# Patient Record
Sex: FEMALE | Race: WHITE | NOT HISPANIC OR LATINO | Employment: UNEMPLOYED | URBAN - METROPOLITAN AREA
[De-identification: names, ages, dates, MRNs, and addresses within clinical notes are randomized per-mention and may not be internally consistent; named-entity substitution may affect disease eponyms.]

---

## 2017-02-09 ENCOUNTER — HOSPITAL ENCOUNTER (EMERGENCY)
Facility: HOSPITAL | Age: 22
Discharge: HOME | End: 2017-02-09
Admitting: EMERGENCY MEDICINE

## 2017-02-09 VITALS
RESPIRATION RATE: 18 BRPM | DIASTOLIC BLOOD PRESSURE: 84 MMHG | SYSTOLIC BLOOD PRESSURE: 124 MMHG | HEIGHT: 67 IN | HEART RATE: 70 BPM | BODY MASS INDEX: 37.51 KG/M2 | WEIGHT: 239 LBS | TEMPERATURE: 98.1 F | OXYGEN SATURATION: 100 %

## 2017-02-09 DIAGNOSIS — K08.89 PAIN, DENTAL: Primary | ICD-10-CM

## 2017-02-09 DIAGNOSIS — K08.409 H/O TOOTH EXTRACTION, UNSPECIFIED EDENTULISM: ICD-10-CM

## 2017-02-09 PROCEDURE — 99282 EMERGENCY DEPT VISIT SF MDM: CPT

## 2017-02-09 PROCEDURE — 99282 EMERGENCY DEPT VISIT SF MDM: CPT | Performed by: NURSE PRACTITIONER

## 2017-02-09 RX ORDER — HYDROCODONE BITARTRATE AND ACETAMINOPHEN 5; 325 MG/1; MG/1
1 TABLET ORAL EVERY 4 HOURS PRN
Qty: 12 TABLET | Refills: 0 | Status: SHIPPED | OUTPATIENT
Start: 2017-02-09 | End: 2018-05-18

## 2017-02-10 NOTE — ED PROVIDER NOTES
Subjective   Patient is a 22 y.o. female presenting with tooth pain.   Dental Pain   Location:  Lower  Lower teeth location:  18/LL 2nd molar  Quality:  Throbbing  Severity:  Moderate  Onset quality:  Sudden  Duration:  1 day  Timing:  Constant  Progression:  Worsening  Chronicity:  New  Context: recent dental surgery    Context: not abscess, cap still on, not crown fracture, not dental caries, not dental fracture, not enamel fracture, filling intact, not intrusion, not malocclusion, normal dentition and not trauma    Previous work-up:  Dental exam  Relieved by:  Nothing  Worsened by:  Jaw movement and touching  Associated symptoms: no congestion and no facial swelling        Review of Systems   Constitutional: Positive for appetite change. Negative for activity change, chills, diaphoresis and fatigue.   HENT: Positive for ear pain. Negative for congestion, facial swelling, postnasal drip, rhinorrhea, sinus pressure, sore throat and trouble swallowing.         Second molar missing left lower.  Facial pain   Eyes: Negative for visual disturbance.   Respiratory: Negative for chest tightness, shortness of breath, wheezing and stridor.    Gastrointestinal: Negative for abdominal pain, diarrhea, nausea and vomiting.   Neurological: Positive for dizziness. Negative for facial asymmetry and weakness.       Past Medical History   Diagnosis Date   • Asthma        No Known Allergies    Past Surgical History   Procedure Laterality Date   • Tonsillectomy and adenoidectomy     • [place] tooth extraction Bilateral        Family History   Problem Relation Age of Onset   • Diabetes Mother    • Heart attack Father    • Cancer Sister    • Diabetes Maternal Grandmother    • Cancer Maternal Grandmother    • Diabetes Maternal Grandfather    • Stroke Maternal Grandfather    • Heart attack Maternal Grandfather        Social History     Social History   • Marital status: Single     Spouse name: N/A   • Number of children: N/A   • Years of  education: N/A     Social History Main Topics   • Smoking status: Former Smoker     Packs/day: 1.00     Years: 6.00     Types: Cigarettes     Quit date: 4/2/2016   • Smokeless tobacco: None   • Alcohol use No   • Drug use: No   • Sexual activity: Yes     Partners: Male     Other Topics Concern   • None     Social History Narrative           Objective   Physical Exam   Constitutional: She is oriented to person, place, and time. She appears well-developed and well-nourished. No distress.   HENT:   Head: Normocephalic and atraumatic.   Right Ear: External ear normal.   Left Ear: External ear normal.   Nose: Nose normal.   Oropharynx left second molar on lower missing.  Signs of recent extraction noted.   Eyes: Conjunctivae are normal. Pupils are equal, round, and reactive to light.   Neck: Neck supple. No tracheal deviation present. No thyromegaly present.   Cardiovascular: Normal rate, regular rhythm and normal heart sounds.  Exam reveals no gallop.    No murmur heard.  Pulmonary/Chest: Effort normal and breath sounds normal. No respiratory distress. She has no wheezes. She has no rales.   Abdominal: Soft. Bowel sounds are normal. She exhibits no distension and no mass. There is no tenderness. There is no rebound.   Musculoskeletal: Normal range of motion. She exhibits no edema or tenderness.   Lymphadenopathy:     She has no cervical adenopathy.   Neurological: She is alert and oriented to person, place, and time.   Skin: Skin is warm and dry. No rash noted. She is not diaphoretic. No erythema.   Psychiatric: She has a normal mood and affect. Judgment and thought content normal.   Nursing note and vitals reviewed.      Procedures         ED Course  ED Course   Comment By Time   She presents the ED complaining of dental pain.  A molar extracted yesterday and was given ibuprofen.  Says he ibuprofen is not helping Blade Alvarez, APRN 02/09 1927                  [facility]    Final diagnoses:   Pain, dental   H/O tooth  extraction, unspecified edentulism            Blade Alvarez, APRN  02/09/17 2019

## 2017-09-01 ENCOUNTER — APPOINTMENT (OUTPATIENT)
Dept: CT IMAGING | Facility: HOSPITAL | Age: 22
End: 2017-09-01

## 2017-09-01 ENCOUNTER — HOSPITAL ENCOUNTER (EMERGENCY)
Facility: HOSPITAL | Age: 22
Discharge: HOME | End: 2017-09-01
Attending: EMERGENCY MEDICINE | Admitting: EMERGENCY MEDICINE

## 2017-09-01 VITALS
HEART RATE: 118 BPM | TEMPERATURE: 99.2 F | HEIGHT: 67 IN | OXYGEN SATURATION: 97 % | DIASTOLIC BLOOD PRESSURE: 72 MMHG | WEIGHT: 253 LBS | RESPIRATION RATE: 20 BRPM | BODY MASS INDEX: 39.71 KG/M2 | SYSTOLIC BLOOD PRESSURE: 120 MMHG

## 2017-09-01 DIAGNOSIS — K52.9 COLITIS: Primary | ICD-10-CM

## 2017-09-01 LAB
ALBUMIN SERPL-MCNC: 4.4 G/DL (ref 3.5–5.2)
ALBUMIN/GLOB SERPL: 1.3 G/DL
ALP SERPL-CCNC: 103 U/L (ref 40–129)
ALT SERPL W P-5'-P-CCNC: 17 U/L (ref 5–33)
ANION GAP SERPL CALCULATED.3IONS-SCNC: 17.7 MMOL/L
AST SERPL-CCNC: 13 U/L (ref 5–32)
B-HCG UR QL: NEGATIVE
BACTERIA UR QL AUTO: ABNORMAL /HPF
BASOPHILS # BLD AUTO: 0.04 10*3/MM3 (ref 0–0.2)
BASOPHILS NFR BLD AUTO: 0.2 % (ref 0–2)
BILIRUB SERPL-MCNC: 0.5 MG/DL (ref 0.2–1.2)
BILIRUB UR QL STRIP: NEGATIVE
BUN BLD-MCNC: 9 MG/DL (ref 6–20)
BUN/CREAT SERPL: 10.3 (ref 7–25)
CALCIUM SPEC-SCNC: 9.6 MG/DL (ref 8.6–10.5)
CHLORIDE SERPL-SCNC: 102 MMOL/L (ref 98–107)
CLARITY UR: ABNORMAL
CO2 SERPL-SCNC: 20.3 MMOL/L (ref 22–29)
COLOR UR: YELLOW
CREAT BLD-MCNC: 0.87 MG/DL (ref 0.57–1)
D-LACTATE SERPL-SCNC: 0.9 MMOL/L (ref 0.5–2)
DEPRECATED RDW RBC AUTO: 41.3 FL (ref 37–54)
EOSINOPHIL # BLD AUTO: 0 10*3/MM3 (ref 0.1–0.3)
EOSINOPHIL NFR BLD AUTO: 0 % (ref 0–4)
ERYTHROCYTE [DISTWIDTH] IN BLOOD BY AUTOMATED COUNT: 13.3 % (ref 11.5–14.5)
GFR SERPL CREATININE-BSD FRML MDRD: 82 ML/MIN/1.73
GLOBULIN UR ELPH-MCNC: 3.4 GM/DL
GLUCOSE BLD-MCNC: 141 MG/DL (ref 65–99)
GLUCOSE UR STRIP-MCNC: NEGATIVE MG/DL
HCT VFR BLD AUTO: 42.7 % (ref 37–47)
HGB BLD-MCNC: 14.4 G/DL (ref 12–16)
HGB UR QL STRIP.AUTO: ABNORMAL
IMM GRANULOCYTES # BLD: 0.08 10*3/MM3 (ref 0–0.03)
IMM GRANULOCYTES NFR BLD: 0.5 % (ref 0–0.5)
KETONES UR QL STRIP: ABNORMAL
LEUKOCYTE ESTERASE UR QL STRIP.AUTO: ABNORMAL
LYMPHOCYTES # BLD AUTO: 0.81 10*3/MM3 (ref 0.6–4.8)
LYMPHOCYTES NFR BLD AUTO: 4.7 % (ref 20–45)
MCH RBC QN AUTO: 28.7 PG (ref 27–31)
MCHC RBC AUTO-ENTMCNC: 33.7 G/DL (ref 31–37)
MCV RBC AUTO: 85.2 FL (ref 81–99)
MONOCYTES # BLD AUTO: 1.11 10*3/MM3 (ref 0–1)
MONOCYTES NFR BLD AUTO: 6.5 % (ref 3–8)
NEUTROPHILS # BLD AUTO: 15.06 10*3/MM3 (ref 1.5–8.3)
NEUTROPHILS NFR BLD AUTO: 88.1 % (ref 45–70)
NITRITE UR QL STRIP: NEGATIVE
NRBC BLD MANUAL-RTO: 0 /100 WBC (ref 0–0)
PH UR STRIP.AUTO: 5.5 [PH] (ref 4.5–8)
PLATELET # BLD AUTO: 188 10*3/MM3 (ref 140–500)
PMV BLD AUTO: 13.8 FL (ref 7.4–10.4)
POTASSIUM BLD-SCNC: 3.4 MMOL/L (ref 3.5–5.2)
PROT SERPL-MCNC: 7.8 G/DL (ref 6–8.5)
PROT UR QL STRIP: ABNORMAL
RBC # BLD AUTO: 5.01 10*6/MM3 (ref 4.2–5.4)
RBC # UR: ABNORMAL /HPF
REF LAB TEST METHOD: ABNORMAL
SODIUM BLD-SCNC: 140 MMOL/L (ref 136–145)
SP GR UR STRIP: 1.02 (ref 1–1.03)
SQUAMOUS #/AREA URNS HPF: ABNORMAL /HPF
UROBILINOGEN UR QL STRIP: ABNORMAL
WBC NRBC COR # BLD: 17.1 10*3/MM3 (ref 4.8–10.8)
WBC UR QL AUTO: ABNORMAL /HPF

## 2017-09-01 PROCEDURE — 96374 THER/PROPH/DIAG INJ IV PUSH: CPT

## 2017-09-01 PROCEDURE — 25010000002 ONDANSETRON PER 1 MG: Performed by: EMERGENCY MEDICINE

## 2017-09-01 PROCEDURE — 81001 URINALYSIS AUTO W/SCOPE: CPT | Performed by: EMERGENCY MEDICINE

## 2017-09-01 PROCEDURE — 83605 ASSAY OF LACTIC ACID: CPT | Performed by: EMERGENCY MEDICINE

## 2017-09-01 PROCEDURE — 87186 SC STD MICRODIL/AGAR DIL: CPT | Performed by: EMERGENCY MEDICINE

## 2017-09-01 PROCEDURE — 80053 COMPREHEN METABOLIC PANEL: CPT | Performed by: EMERGENCY MEDICINE

## 2017-09-01 PROCEDURE — 81025 URINE PREGNANCY TEST: CPT | Performed by: EMERGENCY MEDICINE

## 2017-09-01 PROCEDURE — 87040 BLOOD CULTURE FOR BACTERIA: CPT | Performed by: EMERGENCY MEDICINE

## 2017-09-01 PROCEDURE — 99284 EMERGENCY DEPT VISIT MOD MDM: CPT | Performed by: EMERGENCY MEDICINE

## 2017-09-01 PROCEDURE — 85025 COMPLETE CBC W/AUTO DIFF WBC: CPT | Performed by: EMERGENCY MEDICINE

## 2017-09-01 PROCEDURE — 87086 URINE CULTURE/COLONY COUNT: CPT | Performed by: EMERGENCY MEDICINE

## 2017-09-01 PROCEDURE — 96361 HYDRATE IV INFUSION ADD-ON: CPT

## 2017-09-01 PROCEDURE — 96376 TX/PRO/DX INJ SAME DRUG ADON: CPT

## 2017-09-01 PROCEDURE — 99283 EMERGENCY DEPT VISIT LOW MDM: CPT

## 2017-09-01 PROCEDURE — 0 IOPAMIDOL PER 1 ML: Performed by: EMERGENCY MEDICINE

## 2017-09-01 PROCEDURE — 74177 CT ABD & PELVIS W/CONTRAST: CPT

## 2017-09-01 RX ORDER — CIPROFLOXACIN 500 MG/1
500 TABLET, FILM COATED ORAL 2 TIMES DAILY
Qty: 14 TABLET | Refills: 0 | Status: SHIPPED | OUTPATIENT
Start: 2017-09-01 | End: 2018-05-18

## 2017-09-01 RX ORDER — ONDANSETRON 2 MG/ML
4 INJECTION INTRAMUSCULAR; INTRAVENOUS ONCE
Status: COMPLETED | OUTPATIENT
Start: 2017-09-01 | End: 2017-09-01

## 2017-09-01 RX ORDER — SODIUM CHLORIDE 0.9 % (FLUSH) 0.9 %
10 SYRINGE (ML) INJECTION AS NEEDED
Status: DISCONTINUED | OUTPATIENT
Start: 2017-09-01 | End: 2017-09-01 | Stop reason: HOSPADM

## 2017-09-01 RX ADMIN — SODIUM CHLORIDE 1000 ML: 9 INJECTION, SOLUTION INTRAVENOUS at 10:00

## 2017-09-01 RX ADMIN — IOPAMIDOL 100 ML: 755 INJECTION, SOLUTION INTRAVENOUS at 13:21

## 2017-09-01 RX ADMIN — ONDANSETRON 4 MG: 2 INJECTION, SOLUTION INTRAMUSCULAR; INTRAVENOUS at 10:02

## 2017-09-01 RX ADMIN — ONDANSETRON 4 MG: 2 INJECTION, SOLUTION INTRAMUSCULAR; INTRAVENOUS at 12:12

## 2017-09-01 RX ADMIN — SODIUM CHLORIDE 1000 ML: 9 INJECTION, SOLUTION INTRAVENOUS at 12:15

## 2017-09-01 NOTE — ED PROVIDER NOTES
Subjective   Patient is a 22 y.o. female presenting with vomiting.   History provided by:  Patient  Vomiting   The primary symptoms include fever, nausea, vomiting, diarrhea, myalgias and arthralgias. Primary symptoms do not include abdominal pain, dysuria or rash. The illness began yesterday. The onset was gradual. The problem has not changed since onset.  The illness is also significant for chills and anorexia. The illness does not include back pain. Associated medical issues do not include liver disease, alcohol abuse, PUD, gastric bypass or bowel resection.       Review of Systems   Constitutional: Positive for chills and fever.   HENT: Negative.    Eyes: Negative.    Respiratory: Negative.    Cardiovascular: Negative.    Gastrointestinal: Positive for anorexia, diarrhea, nausea and vomiting. Negative for abdominal pain.   Endocrine: Negative.    Genitourinary: Negative for dysuria, vaginal bleeding and vaginal discharge.   Musculoskeletal: Positive for arthralgias and myalgias. Negative for back pain.   Skin: Negative for rash.   Neurological: Negative.    Hematological: Bruises/bleeds easily.       Past Medical History:   Diagnosis Date   • Asthma    • Rosacea    • UTI (urinary tract infection)        No Known Allergies    Past Surgical History:   Procedure Laterality Date   • TONSILLECTOMY AND ADENOIDECTOMY     • WISDOM TOOTH EXTRACTION Bilateral        Family History   Problem Relation Age of Onset   • Diabetes Mother    • Heart attack Father    • Cancer Sister    • Diabetes Maternal Grandmother    • Cancer Maternal Grandmother    • Diabetes Maternal Grandfather    • Stroke Maternal Grandfather    • Heart attack Maternal Grandfather        Social History     Social History   • Marital status: Single     Spouse name: N/A   • Number of children: N/A   • Years of education: N/A     Social History Main Topics   • Smoking status: Former Smoker     Packs/day: 1.00     Years: 6.00     Types: Cigarettes     Quit  date: 4/2/2016   • Smokeless tobacco: None   • Alcohol use Yes      Comment: rare   • Drug use: No   • Sexual activity: Yes     Partners: Male     Other Topics Concern   • None     Social History Narrative   • None           Objective   Physical Exam   Constitutional: She is oriented to person, place, and time. She appears well-developed and well-nourished. No distress.   HENT:   Head: Normocephalic and atraumatic.   Right Ear: External ear normal.   Left Ear: External ear normal.   Mouth/Throat: Oropharynx is clear and moist.   Eyes: Conjunctivae are normal. Pupils are equal, round, and reactive to light.   Neck: Normal range of motion. Neck supple.   Cardiovascular: Normal rate and regular rhythm.    Pulmonary/Chest: Effort normal and breath sounds normal.   Abdominal: Soft. Bowel sounds are normal. She exhibits no distension. There is no tenderness. There is no guarding.   Musculoskeletal: Normal range of motion.   Neurological: She is alert and oriented to person, place, and time.   Skin: Skin is warm and dry. She is not diaphoretic.   Psychiatric: She has a normal mood and affect.   Nursing note and vitals reviewed.      Procedures         ED Course  ED Course   Comment By Time   Discussed with patient's CT scan results.  And likely factious colitis.  Will treat with Cipro and have her follow-up.  If her strong return precautions.  She did tolerate by mouth here and her pulse at discharge is down to 95. Logan Butler MD 09/01 1733                  [facility]    Final diagnoses:   Colitis            Logan Butler MD  09/01/17 [id number]

## 2017-09-04 LAB — BACTERIA SPEC AEROBE CULT: ABNORMAL

## 2017-09-06 LAB
BACTERIA SPEC AEROBE CULT: NORMAL
BACTERIA SPEC AEROBE CULT: NORMAL

## 2018-02-21 ENCOUNTER — PROCEDURE VISIT (OUTPATIENT)
Dept: OBSTETRICS AND GYNECOLOGY | Facility: CLINIC | Age: 23
End: 2018-02-21

## 2018-02-21 VITALS
WEIGHT: 241 LBS | DIASTOLIC BLOOD PRESSURE: 80 MMHG | SYSTOLIC BLOOD PRESSURE: 130 MMHG | HEIGHT: 65 IN | BODY MASS INDEX: 40.15 KG/M2

## 2018-02-21 DIAGNOSIS — Z31.9 PATIENT DESIRES PREGNANCY: ICD-10-CM

## 2018-02-21 PROCEDURE — 11982 REMOVE DRUG IMPLANT DEVICE: CPT | Performed by: OBSTETRICS & GYNECOLOGY

## 2018-02-21 NOTE — PROGRESS NOTES
Procedure: Nexplanon removal    Procedures      Pre Dx: 1) Nexplanon removal  Post Dx: 1) Nexplanon removal     The risks, benefits, and alternatives to remove the device have been discussed with the patient at length. All of her questions are answered. She is aware of the need for alternative means of contraception if desired. Verbal informed consent is obtained.    Able to easily palpate the device in the nondominant left arm. Additional imaging was not required. The device feels freely mobile and easily accessible. She was placed in the  supine position with her arm elevated at her side. The skin over this site is prepped with Betadine. 2-3 mL of 1% lidocaine with no epinephrine was injected.  Downward pressure was applied on the end of the implant nearest the axilla, and a 2-3 mm incision was made in a longitudinal direction of the arm at the tip of the implant closest to the elbow. The implant was then pushed gently toward the incision until the tip was visible. The fibrous capsule was opened with blunt dissection using a hemostat. The implant was grasp with a hemostat and was easily removed intact. It measured a  full 4 cm in length.    Tolerated well  No apparent complications    The skin was cleansed and dried. A Steri-Strip was applied. The arm was gently wrapped with Kerlex gauze. The patient had normal strength and sensation in her upper extremity. There was no significant bleeding. There were no complications. The patient was advised about proper care of the dressings. She was advised to call or return for complications such as fever, signs of infection, increased pain, or any other concerns.    Warning signs, limitations and expectations reviewed.     Robby Dillon MD    2/21/2018  3:11 PM

## 2018-05-18 ENCOUNTER — OFFICE VISIT (OUTPATIENT)
Dept: OBSTETRICS AND GYNECOLOGY | Facility: CLINIC | Age: 23
End: 2018-05-18

## 2018-05-18 VITALS
BODY MASS INDEX: 38.82 KG/M2 | SYSTOLIC BLOOD PRESSURE: 118 MMHG | WEIGHT: 233 LBS | DIASTOLIC BLOOD PRESSURE: 70 MMHG | HEIGHT: 65 IN

## 2018-05-18 DIAGNOSIS — N91.2 AMENORRHEA: Primary | ICD-10-CM

## 2018-05-18 DIAGNOSIS — R11.14 BILIOUS VOMITING WITH NAUSEA: ICD-10-CM

## 2018-05-18 LAB
B-HCG UR QL: POSITIVE
INTERNAL NEGATIVE CONTROL: NEGATIVE
INTERNAL POSITIVE CONTROL: POSITIVE
Lab: ABNORMAL

## 2018-05-18 PROCEDURE — 81025 URINE PREGNANCY TEST: CPT | Performed by: OBSTETRICS & GYNECOLOGY

## 2018-05-18 PROCEDURE — 99213 OFFICE O/P EST LOW 20 MIN: CPT | Performed by: OBSTETRICS & GYNECOLOGY

## 2018-05-18 RX ORDER — PRENATAL WITH FERROUS FUM AND FOLIC ACID 3080; 920; 120; 400; 22; 1.84; 3; 20; 10; 1; 12; 200; 27; 25; 2 [IU]/1; [IU]/1; MG/1; [IU]/1; MG/1; MG/1; MG/1; MG/1; MG/1; MG/1; UG/1; MG/1; MG/1; MG/1; MG/1
1 TABLET ORAL DAILY
Qty: 30 TABLET | Refills: 9 | Status: SHIPPED | OUTPATIENT
Start: 2018-05-18 | End: 2020-09-30

## 2018-05-18 RX ORDER — ONDANSETRON 4 MG/1
4 TABLET, FILM COATED ORAL EVERY 8 HOURS PRN
Qty: 30 TABLET | Refills: 2 | Status: SHIPPED | OUTPATIENT
Start: 2018-05-18 | End: 2018-07-14 | Stop reason: HOSPADM

## 2018-05-18 NOTE — PROGRESS NOTES
"      Adamaris Whitehead is a 23 y.o. patient who presents for follow up of   Chief Complaint   Patient presents with   • Amenorrhea       HPI this is a 23-year-old  3 para 3003 who had a Nexplanon removed 3 months ago.  She had a period shortly thereafter but none since.  She started having nausea and vomiting about 2 weeks ago.  It is progressively gotten worse and she took a home regnancy test which was positive.  Her nausea vomiting is worsening and she is struggling to keep even liquids down.  She's had no vaginal bleeding.    The following portions of the patient's history were reviewed and updated as appropriate: allergies, current medications and problem list.    Review of Systems   Constitutional: Negative for appetite change, fever and unexpected weight change.   Respiratory: Negative for cough and shortness of breath.    Cardiovascular: Negative for chest pain and palpitations.   Gastrointestinal: Positive for nausea and vomiting. Negative for abdominal distention, abdominal pain, constipation and diarrhea.   Endocrine: Negative.    Genitourinary: Positive for menstrual problem (amenorrhea). Negative for dyspareunia, pelvic pain and vaginal discharge.   Skin: Negative.    Hematological: Negative.    Psychiatric/Behavioral: Negative for dysphoric mood and sleep disturbance. The patient is not nervous/anxious.        /70   Ht 165.1 cm (65\")   Wt 106 kg (233 lb)   Breastfeeding? No   BMI 38.77 kg/m²     Physical Exam   Constitutional: She is oriented to person, place, and time. She appears well-developed and well-nourished.   HENT:   Head: Normocephalic and atraumatic.   Pulmonary/Chest: Effort normal.   Abdominal: Soft. Bowel sounds are normal. She exhibits no distension and no mass. There is no tenderness. There is no rebound and no guarding.   Musculoskeletal: Normal range of motion.   Neurological: She is alert and oriented to person, place, and time.   Skin: Skin is warm and dry. "   Psychiatric: She has a normal mood and affect. Her behavior is normal. Judgment and thought content normal.   Nursing note and vitals reviewed.        Assessment/Plan    Adamaris was seen today for amenorrhea.    Diagnoses and all orders for this visit:    Amenorrhea  -     POC Pregnancy, Urine    Bilious vomiting with nausea    Other orders  -     ondansetron (ZOFRAN) 4 MG tablet; Take 1 tablet by mouth Every 8 (Eight) Hours As Needed for Nausea or Vomiting.  -     Prenatal Vit-Fe Fumarate-FA (PRENATAL 27-1) 27-1 MG tablet tablet; Take 1 tablet by mouth Daily.    Check pelvic ultrasound for EDC given the fact that she has not had a period since she stopped her birth control.  Start oral Zofran and prenatal vitamins as needed.    Return for US, TV, NOB.      Robby Dillon MD  5/18/2018  10:40 AM

## 2018-06-18 ENCOUNTER — INITIAL PRENATAL (OUTPATIENT)
Dept: OBSTETRICS AND GYNECOLOGY | Facility: CLINIC | Age: 23
End: 2018-06-18

## 2018-06-18 ENCOUNTER — PROCEDURE VISIT (OUTPATIENT)
Dept: OBSTETRICS AND GYNECOLOGY | Facility: CLINIC | Age: 23
End: 2018-06-18

## 2018-06-18 VITALS — SYSTOLIC BLOOD PRESSURE: 118 MMHG | DIASTOLIC BLOOD PRESSURE: 76 MMHG | WEIGHT: 223 LBS | BODY MASS INDEX: 37.11 KG/M2

## 2018-06-18 DIAGNOSIS — O21.9 NAUSEA AND VOMITING DURING PREGNANCY PRIOR TO 22 WEEKS GESTATION: ICD-10-CM

## 2018-06-18 DIAGNOSIS — Z34.91 INITIAL OBSTETRIC VISIT IN FIRST TRIMESTER: Primary | ICD-10-CM

## 2018-06-18 DIAGNOSIS — O36.80X0 ENCOUNTER TO DETERMINE FETAL VIABILITY OF PREGNANCY, SINGLE OR UNSPECIFIED FETUS: Primary | ICD-10-CM

## 2018-06-18 DIAGNOSIS — Z67.91 RH NEGATIVE STATUS DURING PREGNANCY IN THIRD TRIMESTER: ICD-10-CM

## 2018-06-18 DIAGNOSIS — Z34.80 ENCOUNTER FOR SUPERVISION OF NORMAL PREGNANCY IN MULTIGRAVIDA, ANTEPARTUM: ICD-10-CM

## 2018-06-18 DIAGNOSIS — O26.893 RH NEGATIVE STATUS DURING PREGNANCY IN THIRD TRIMESTER: ICD-10-CM

## 2018-06-18 LAB
C TRACH RRNA SPEC DONR QL NAA+PROBE: NEGATIVE
CANNABINOIDS SERPL QL: POSITIVE
N GONORRHOEA DNA SPEC QL NAA+PROBE: NEGATIVE

## 2018-06-18 PROCEDURE — 96372 THER/PROPH/DIAG INJ SC/IM: CPT | Performed by: NURSE PRACTITIONER

## 2018-06-18 PROCEDURE — 76801 OB US < 14 WKS SINGLE FETUS: CPT | Performed by: NURSE PRACTITIONER

## 2018-06-18 PROCEDURE — 99213 OFFICE O/P EST LOW 20 MIN: CPT | Performed by: NURSE PRACTITIONER

## 2018-06-18 RX ORDER — DOXYLAMINE SUCCINATE AND PYRIDOXINE HYDROCHLORIDE, DELAYED RELEASE TABLETS 10 MG/10 MG 10; 10 MG/1; MG/1
2 TABLET, DELAYED RELEASE ORAL NIGHTLY PRN
Qty: 60 TABLET | Refills: 1 | Status: SHIPPED | OUTPATIENT
Start: 2018-06-18 | End: 2019-01-02 | Stop reason: HOSPADM

## 2018-06-18 NOTE — PROGRESS NOTES
Initial ob visit     Chief Complaint   Patient presents with   • Initial Prenatal Visit       Adamaris Whitehead is being seen today for her first obstetrical visit.  She is a 23 y.o.    11w1d gestation.     #: 1, Date: 13, Sex: Female, Weight: 3317 g (7 lb 5 oz), GA: 40w0d, Delivery: Vaginal, Spontaneous Delivery, Apgar1: None, Apgar5: None, Living: Living, Birth Comments: None    #: 2, Date: 04/14/15, Sex: Male, Weight: 3969 g (8 lb 12 oz), GA: 40w0d, Delivery: Vaginal, Spontaneous Delivery, Apgar1: None, Apgar5: None, Living: Living, Birth Comments: None    #: 3, Date: 16, Sex: Male, Weight: None, GA: 39w1d, Delivery: Vaginal, Spontaneous Delivery, Apgar1: 8, Apgar5: 9, Living: Living, Birth Comments: None    #: 4, Date: None, Sex: None, Weight: None, GA: None, Delivery: None, Apgar1: None, Apgar5: None, Living: None, Birth Comments: None      Current obstetric complaints : nausea    Prior obstetric issues, potential pregnancy concerns:  x 3   Family history of genetic issues (includes FOB): denies   Prior infections concerning in pregnancy (Rash, fever in last 2 weeks): denies   Varicella Hx -vaccine   Flu vaccine- 2018   Prior testing for Cystic Fibrosis Carrier or Sickle Cell Trait- uncertain   Prepregnancy BMI - Body mass index is 37.11 kg/m².    Past Medical History:   Diagnosis Date   • Asthma    • Rosacea    • UTI (urinary tract infection)        Past Surgical History:   Procedure Laterality Date   • TONSILLECTOMY AND ADENOIDECTOMY     • WISDOM TOOTH EXTRACTION Bilateral          Current Outpatient Prescriptions:   •  ondansetron (ZOFRAN) 4 MG tablet, Take 1 tablet by mouth Every 8 (Eight) Hours As Needed for Nausea or Vomiting., Disp: 30 tablet, Rfl: 2  •  Prenatal Vit-Fe Fumarate-FA (PRENATAL ) 27-1 MG tablet tablet, Take 1 tablet by mouth Daily., Disp: 30 tablet, Rfl: 9    No Known Allergies    Social History     Social History   • Marital status: Single     Spouse name: N/A    • Number of children: N/A   • Years of education: N/A     Occupational History   • Not on file.     Social History Main Topics   • Smoking status: Former Smoker     Packs/day: 1.00     Years: 6.00     Types: Cigarettes     Quit date: 4/2/2016   • Smokeless tobacco: Not on file   • Alcohol use Yes      Comment: rare   • Drug use: No   • Sexual activity: Yes     Partners: Male     Other Topics Concern   • Not on file     Social History Narrative   • No narrative on file       Family History   Problem Relation Age of Onset   • Diabetes Mother    • Heart attack Father    • Cancer Sister    • Diabetes Maternal Grandmother    • Cancer Maternal Grandmother    • Diabetes Maternal Grandfather    • Stroke Maternal Grandfather    • Heart attack Maternal Grandfather        Review of systems     A comprehensive review of systems was negative except for: Gastrointestinal: positive for nausea     Objective    /76   Wt 101 kg (223 lb)   BMI 37.11 kg/m²       General Appearance:    Alert, cooperative, in no acute distress, habitus obese    Head:    Not examined   Eyes:           Not examined   Ears:  Not examined       Neck: Not examined    Back:     No kyphosis present, no scoliosis present,                       Lungs:     Clear to auscultation,respirations regular, even and                   unlabored    Heart:    Regular rhythm and normal rate, normal S1 and S2, no            murmur, no gallop, no rub, no click   Breast Exam:    No masses, No nipple discharge   Abdomen:     Normal bowel sounds, no masses, no organomegaly, soft        non-tender, non-distended, no guarding, no rebound                 tenderness   Genitalia:    Vulva - No masses, no atrophy, no lesions    Vagina - No discharge, No bleeding    Cervix - No Lesions, closed. Pap collected.      Uterus - Consistent with 11 weeks.     Adnexa - No masses, non tender       Extremities:   Moves all extremities well, no edema, no cyanosis, no              redness    Pulses:   Pulses palpable and equal bilaterally   Skin:   No bleeding, bruising or rash   Lymph nodes:   No palpable adenopathy   Neurologic:   Sensation intact, A&O times 3      Assessment    1) Pregnancy at 11w1d- US IMP: Single, viable IUP @ 11.1 weeks gestation.   2) Nausea- Taking Zofran. ERX Vit B6 and doxylamine.  3) CF status-  Uncertain, need to check records   4) BMI 40- Obese women with a pre-pregnancy BMI of 30+ should strive to gain approx 11-20 pounds for the entire pregnancy.   5) Rh neg- Needs rhogam @ 28 weeks       Plan  New OB exam completed, pap collected   Initial labs reviewed  Patient is taking Prenatal vitamins  Problem list reviewed and updated  Reviewed routine prenatal care with the office to include but not limited to expected weight gain during pregnancy, Tylenol products are fine, avoid aspirin and ibuprofen; Zika (travel restrictions/ok to use insect repellant); not to change cat litter; food restrictions; avoidance of alcohol, tobacco, drugs and saunas/hot tubs.   All questions answered.     RTO 4  weeks for OB BLAINE Norris    6/18/2018  4:32 PM

## 2018-06-19 LAB
ABO GROUP BLD: (no result)
BASOPHILS # BLD AUTO: 0 X10E3/UL (ref 0–0.2)
BASOPHILS NFR BLD AUTO: 0 %
BLD GP AB SCN SERPL QL: NEGATIVE
EOSINOPHIL # BLD AUTO: 0.1 X10E3/UL (ref 0–0.4)
EOSINOPHIL NFR BLD AUTO: 1 %
ERYTHROCYTE [DISTWIDTH] IN BLOOD BY AUTOMATED COUNT: 13.9 % (ref 12.3–15.4)
HBA1C MFR BLD: 5 % (ref 4.8–5.6)
HBV SURFACE AG SERPL QL IA: NEGATIVE
HCT VFR BLD AUTO: 40.8 % (ref 34–46.6)
HCV AB S/CO SERPL IA: <0.1 S/CO RATIO (ref 0–0.9)
HGB BLD-MCNC: 13.7 G/DL (ref 11.1–15.9)
HIV 1+2 AB+HIV1 P24 AG SERPL QL IA: NON REACTIVE
IMM GRANULOCYTES # BLD: 0 X10E3/UL (ref 0–0.1)
IMM GRANULOCYTES NFR BLD: 0 %
LYMPHOCYTES # BLD AUTO: 3.2 X10E3/UL (ref 0.7–3.1)
LYMPHOCYTES NFR BLD AUTO: 25 %
MCH RBC QN AUTO: 30.1 PG (ref 26.6–33)
MCHC RBC AUTO-ENTMCNC: 33.6 G/DL (ref 31.5–35.7)
MCV RBC AUTO: 90 FL (ref 79–97)
MONOCYTES # BLD AUTO: 0.7 X10E3/UL (ref 0.1–0.9)
MONOCYTES NFR BLD AUTO: 6 %
NEUTROPHILS # BLD AUTO: 8.8 X10E3/UL (ref 1.4–7)
NEUTROPHILS NFR BLD AUTO: 68 %
PLATELET # BLD AUTO: 217 X10E3/UL (ref 150–379)
RBC # BLD AUTO: 4.55 X10E6/UL (ref 3.77–5.28)
RH BLD: NEGATIVE
RPR SER QL: NON REACTIVE
RUBV IGG SERPL IA-ACNC: 12.2 INDEX
WBC # BLD AUTO: 12.9 X10E3/UL (ref 3.4–10.8)

## 2018-06-20 LAB
C TRACH RRNA CVX QL NAA+PROBE: NEGATIVE
CONV .: NORMAL
CYTOLOGIST CVX/VAG CYTO: NORMAL
CYTOLOGY CVX/VAG DOC THIN PREP: NORMAL
DX ICD CODE: NORMAL
HIV 1 & 2 AB SER-IMP: NORMAL
N GONORRHOEA RRNA CVX QL NAA+PROBE: NEGATIVE
OTHER STN SPEC: NORMAL
PATH REPORT.FINAL DX SPEC: NORMAL
STAT OF ADQ CVX/VAG CYTO-IMP: NORMAL
T VAGINALIS RRNA SPEC QL NAA+PROBE: NEGATIVE

## 2018-06-22 PROBLEM — Z34.91 INITIAL OBSTETRIC VISIT IN FIRST TRIMESTER: Status: ACTIVE | Noted: 2018-06-22

## 2018-06-22 PROBLEM — O21.9 NAUSEA AND VOMITING DURING PREGNANCY PRIOR TO 22 WEEKS GESTATION: Status: ACTIVE | Noted: 2018-06-22

## 2018-06-22 PROBLEM — Z34.80 ENCOUNTER FOR SUPERVISION OF NORMAL PREGNANCY IN MULTIGRAVIDA, ANTEPARTUM: Status: ACTIVE | Noted: 2018-06-22

## 2018-06-22 LAB
BACTERIA UR CULT: ABNORMAL
BACTERIA UR CULT: ABNORMAL
DRUGS UR: NORMAL
OTHER ANTIBIOTIC SUSC ISLT: ABNORMAL

## 2018-06-23 LAB
CFDNA.FET/CFDNA.TOTAL SFR FETUS: NORMAL %
CITATION REF LAB TEST: NORMAL
FET CHR 13 TS PLAS.CFDNA QL: NEGATIVE
FET CHR 13+18+21 TS PLAS.CFDNA QL: NORMAL
FET CHR 18 TS PLAS.CFDNA QL: NEGATIVE
FET CHR 21 TS PLAS.CFDNA QL: NEGATIVE
FET Y CHROM PLAS.CFDNA QL: NOT DETECTED
FET Y CHROM PLAS.CFDNA: NORMAL
LAB DIRECTOR NAME PROVIDER: NORMAL
LABORATORY COMMENT REPORT: NORMAL
LIMITATIONS OF THE TEST: NORMAL
NOTE: NORMAL
REF LAB TEST METHOD: NORMAL
SERVICE CMNT 02-IMP: NORMAL
SERVICE CMNT 03-IMP: NORMAL
SERVICE CMNT-IMP: NORMAL
TEST PERFORMANCE INFO SPEC: NORMAL
TEST PERFORMANCE INFO SPEC: NORMAL

## 2018-07-02 ENCOUNTER — TELEPHONE (OUTPATIENT)
Dept: OBSTETRICS AND GYNECOLOGY | Facility: CLINIC | Age: 23
End: 2018-07-02

## 2018-07-02 RX ORDER — DIPHENHYDRAMINE HYDROCHLORIDE 25 MG/1
25 CAPSULE ORAL NIGHTLY
Qty: 30 TABLET | Refills: 1 | Status: SHIPPED | OUTPATIENT
Start: 2018-07-02 | End: 2018-07-14 | Stop reason: HOSPADM

## 2018-07-11 ENCOUNTER — HOSPITAL ENCOUNTER (INPATIENT)
Facility: HOSPITAL | Age: 23
LOS: 2 days | Discharge: HOME | End: 2018-07-14
Attending: EMERGENCY MEDICINE | Admitting: OBSTETRICS & GYNECOLOGY

## 2018-07-11 DIAGNOSIS — K59.01 SLOW TRANSIT CONSTIPATION: ICD-10-CM

## 2018-07-11 DIAGNOSIS — Z3A.14 14 WEEKS GESTATION OF PREGNANCY: ICD-10-CM

## 2018-07-11 DIAGNOSIS — N10 ACUTE PYELONEPHRITIS: Primary | ICD-10-CM

## 2018-07-11 DIAGNOSIS — O99.891 BACK PAIN AFFECTING PREGNANCY IN SECOND TRIMESTER: ICD-10-CM

## 2018-07-11 DIAGNOSIS — M54.9 BACK PAIN AFFECTING PREGNANCY IN SECOND TRIMESTER: ICD-10-CM

## 2018-07-11 DIAGNOSIS — R11.2 NON-INTRACTABLE VOMITING WITH NAUSEA, UNSPECIFIED VOMITING TYPE: ICD-10-CM

## 2018-07-11 LAB
ALBUMIN SERPL-MCNC: 3.8 G/DL (ref 3.5–5.2)
ALBUMIN/GLOB SERPL: 1.2 G/DL
ALP SERPL-CCNC: 126 U/L (ref 40–129)
ALT SERPL W P-5'-P-CCNC: 11 U/L (ref 5–33)
ANION GAP SERPL CALCULATED.3IONS-SCNC: 16.2 MMOL/L
AST SERPL-CCNC: 12 U/L (ref 5–32)
BACTERIA UR QL AUTO: ABNORMAL /HPF
BASOPHILS # BLD AUTO: 0.01 10*3/MM3 (ref 0–0.2)
BASOPHILS NFR BLD AUTO: 0.1 % (ref 0–2)
BILIRUB SERPL-MCNC: 0.6 MG/DL (ref 0.2–1.2)
BILIRUB UR QL STRIP: ABNORMAL
BUN BLD-MCNC: 4 MG/DL (ref 6–20)
BUN/CREAT SERPL: 7.1 (ref 7–25)
CALCIUM SPEC-SCNC: 9.4 MG/DL (ref 8.6–10.5)
CHLORIDE SERPL-SCNC: 100 MMOL/L (ref 98–107)
CLARITY UR: ABNORMAL
CO2 SERPL-SCNC: 23.8 MMOL/L (ref 22–29)
COLOR UR: ABNORMAL
CREAT BLD-MCNC: 0.56 MG/DL (ref 0.57–1)
D-LACTATE SERPL-SCNC: 0.9 MMOL/L (ref 0.5–2)
DEPRECATED RDW RBC AUTO: 41.8 FL (ref 37–54)
EOSINOPHIL # BLD AUTO: 0.01 10*3/MM3 (ref 0.1–0.3)
EOSINOPHIL NFR BLD AUTO: 0.1 % (ref 0–4)
ERYTHROCYTE [DISTWIDTH] IN BLOOD BY AUTOMATED COUNT: 13.2 % (ref 11.5–14.5)
GFR SERPL CREATININE-BSD FRML MDRD: 135 ML/MIN/1.73
GLOBULIN UR ELPH-MCNC: 3.3 GM/DL
GLUCOSE BLD-MCNC: 113 MG/DL (ref 65–99)
GLUCOSE UR STRIP-MCNC: NEGATIVE MG/DL
HCT VFR BLD AUTO: 35.3 % (ref 37–47)
HGB BLD-MCNC: 12.4 G/DL (ref 12–16)
HGB UR QL STRIP.AUTO: ABNORMAL
HYALINE CASTS UR QL AUTO: ABNORMAL /LPF
IMM GRANULOCYTES # BLD: 0.05 10*3/MM3 (ref 0–0.03)
IMM GRANULOCYTES NFR BLD: 0.4 % (ref 0–0.5)
KETONES UR QL STRIP: ABNORMAL
LEUKOCYTE ESTERASE UR QL STRIP.AUTO: ABNORMAL
LIPASE SERPL-CCNC: 22 U/L (ref 13–60)
LYMPHOCYTES # BLD AUTO: 1.42 10*3/MM3 (ref 0.6–4.8)
LYMPHOCYTES NFR BLD AUTO: 10.2 % (ref 20–45)
MCH RBC QN AUTO: 30.8 PG (ref 27–31)
MCHC RBC AUTO-ENTMCNC: 35.1 G/DL (ref 31–37)
MCV RBC AUTO: 87.8 FL (ref 81–99)
MONOCYTES # BLD AUTO: 0.74 10*3/MM3 (ref 0–1)
MONOCYTES NFR BLD AUTO: 5.3 % (ref 3–8)
NEUTROPHILS # BLD AUTO: 11.74 10*3/MM3 (ref 1.5–8.3)
NEUTROPHILS NFR BLD AUTO: 83.9 % (ref 45–70)
NITRITE UR QL STRIP: POSITIVE
NRBC BLD MANUAL-RTO: 0 /100 WBC (ref 0–0)
PH UR STRIP.AUTO: 6 [PH] (ref 4.5–8)
PLATELET # BLD AUTO: 193 10*3/MM3 (ref 140–500)
PMV BLD AUTO: 12.5 FL (ref 7.4–10.4)
POTASSIUM BLD-SCNC: 3.7 MMOL/L (ref 3.5–5.2)
PROT SERPL-MCNC: 7.1 G/DL (ref 6–8.5)
PROT UR QL STRIP: ABNORMAL
RBC # BLD AUTO: 4.02 10*6/MM3 (ref 4.2–5.4)
RBC # UR: ABNORMAL /HPF
REF LAB TEST METHOD: ABNORMAL
SODIUM BLD-SCNC: 140 MMOL/L (ref 136–145)
SP GR UR STRIP: 1.02 (ref 1–1.03)
SQUAMOUS #/AREA URNS HPF: ABNORMAL /HPF
UROBILINOGEN UR QL STRIP: ABNORMAL
WBC NRBC COR # BLD: 13.97 10*3/MM3 (ref 4.8–10.8)
WBC UR QL AUTO: ABNORMAL /HPF

## 2018-07-11 PROCEDURE — 85025 COMPLETE CBC W/AUTO DIFF WBC: CPT | Performed by: EMERGENCY MEDICINE

## 2018-07-11 PROCEDURE — 99284 EMERGENCY DEPT VISIT MOD MDM: CPT | Performed by: EMERGENCY MEDICINE

## 2018-07-11 PROCEDURE — 99284 EMERGENCY DEPT VISIT MOD MDM: CPT

## 2018-07-11 PROCEDURE — 83605 ASSAY OF LACTIC ACID: CPT | Performed by: EMERGENCY MEDICINE

## 2018-07-11 PROCEDURE — 83690 ASSAY OF LIPASE: CPT | Performed by: EMERGENCY MEDICINE

## 2018-07-11 PROCEDURE — 80053 COMPREHEN METABOLIC PANEL: CPT | Performed by: EMERGENCY MEDICINE

## 2018-07-11 PROCEDURE — 25010000002 ONDANSETRON PER 1 MG: Performed by: EMERGENCY MEDICINE

## 2018-07-11 PROCEDURE — 25010000002 CEFTRIAXONE PER 250 MG: Performed by: EMERGENCY MEDICINE

## 2018-07-11 PROCEDURE — 81001 URINALYSIS AUTO W/SCOPE: CPT | Performed by: EMERGENCY MEDICINE

## 2018-07-11 RX ORDER — ACETAMINOPHEN 500 MG
1000 TABLET ORAL ONCE
Status: COMPLETED | OUTPATIENT
Start: 2018-07-11 | End: 2018-07-11

## 2018-07-11 RX ORDER — ONDANSETRON 2 MG/ML
4 INJECTION INTRAMUSCULAR; INTRAVENOUS ONCE
Status: COMPLETED | OUTPATIENT
Start: 2018-07-11 | End: 2018-07-11

## 2018-07-11 RX ORDER — SODIUM CHLORIDE 0.9 % (FLUSH) 0.9 %
10 SYRINGE (ML) INJECTION AS NEEDED
Status: DISCONTINUED | OUTPATIENT
Start: 2018-07-11 | End: 2018-07-14 | Stop reason: HOSPADM

## 2018-07-11 RX ORDER — SODIUM CHLORIDE 9 MG/ML
125 INJECTION, SOLUTION INTRAVENOUS CONTINUOUS
Status: DISCONTINUED | OUTPATIENT
Start: 2018-07-11 | End: 2018-07-14 | Stop reason: HOSPADM

## 2018-07-11 RX ADMIN — ONDANSETRON 4 MG: 2 INJECTION, SOLUTION INTRAMUSCULAR; INTRAVENOUS at 23:07

## 2018-07-11 RX ADMIN — ACETAMINOPHEN 1000 MG: 500 TABLET ORAL at 23:07

## 2018-07-11 RX ADMIN — SODIUM CHLORIDE 1000 ML: 9 INJECTION, SOLUTION INTRAVENOUS at 23:07

## 2018-07-11 RX ADMIN — CEFTRIAXONE 1 G: 1 INJECTION, SOLUTION INTRAVENOUS at 23:59

## 2018-07-12 ENCOUNTER — APPOINTMENT (OUTPATIENT)
Dept: ULTRASOUND IMAGING | Facility: HOSPITAL | Age: 23
End: 2018-07-12

## 2018-07-12 PROBLEM — L71.9 ROSACEA: Status: ACTIVE | Noted: 2018-07-12

## 2018-07-12 PROBLEM — N10 ACUTE PYELONEPHRITIS: Status: ACTIVE | Noted: 2018-07-12

## 2018-07-12 PROBLEM — Z34.91 INITIAL OBSTETRIC VISIT IN FIRST TRIMESTER: Status: RESOLVED | Noted: 2018-06-22 | Resolved: 2018-07-12

## 2018-07-12 PROBLEM — Z34.80 ENCOUNTER FOR SUPERVISION OF NORMAL PREGNANCY IN MULTIGRAVIDA, ANTEPARTUM: Status: RESOLVED | Noted: 2018-06-22 | Resolved: 2018-07-12

## 2018-07-12 PROBLEM — O23.00 PYELONEPHRITIS AFFECTING PREGNANCY: Status: ACTIVE | Noted: 2018-07-12

## 2018-07-12 LAB
ALBUMIN SERPL-MCNC: 3.3 G/DL (ref 3.5–5.2)
ALBUMIN/GLOB SERPL: 1.1 G/DL
ALP SERPL-CCNC: 108 U/L (ref 40–129)
ALT SERPL W P-5'-P-CCNC: 11 U/L (ref 5–33)
ANION GAP SERPL CALCULATED.3IONS-SCNC: 10.1 MMOL/L
AST SERPL-CCNC: 12 U/L (ref 5–32)
BASOPHILS # BLD AUTO: 0.02 10*3/MM3 (ref 0–0.2)
BASOPHILS NFR BLD AUTO: 0.2 % (ref 0–2)
BILIRUB SERPL-MCNC: 0.4 MG/DL (ref 0.2–1.2)
BUN BLD-MCNC: 4 MG/DL (ref 6–20)
BUN/CREAT SERPL: 7.7 (ref 7–25)
CALCIUM SPEC-SCNC: 8.8 MG/DL (ref 8.6–10.5)
CHLORIDE SERPL-SCNC: 104 MMOL/L (ref 98–107)
CO2 SERPL-SCNC: 23.9 MMOL/L (ref 22–29)
CREAT BLD-MCNC: 0.52 MG/DL (ref 0.57–1)
D-LACTATE SERPL-SCNC: 0.5 MMOL/L (ref 0.5–2)
DEPRECATED RDW RBC AUTO: 44 FL (ref 37–54)
EOSINOPHIL # BLD AUTO: 0.05 10*3/MM3 (ref 0.1–0.3)
EOSINOPHIL NFR BLD AUTO: 0.5 % (ref 0–4)
ERYTHROCYTE [DISTWIDTH] IN BLOOD BY AUTOMATED COUNT: 13.4 % (ref 11.5–14.5)
GFR SERPL CREATININE-BSD FRML MDRD: 147 ML/MIN/1.73
GLOBULIN UR ELPH-MCNC: 3 GM/DL
GLUCOSE BLD-MCNC: 78 MG/DL (ref 65–99)
HCT VFR BLD AUTO: 35 % (ref 37–47)
HGB BLD-MCNC: 11.8 G/DL (ref 12–16)
IMM GRANULOCYTES # BLD: 0.05 10*3/MM3 (ref 0–0.03)
IMM GRANULOCYTES NFR BLD: 0.5 % (ref 0–0.5)
LYMPHOCYTES # BLD AUTO: 2.15 10*3/MM3 (ref 0.6–4.8)
LYMPHOCYTES NFR BLD AUTO: 21.2 % (ref 20–45)
MCH RBC QN AUTO: 30.4 PG (ref 27–31)
MCHC RBC AUTO-ENTMCNC: 33.7 G/DL (ref 31–37)
MCV RBC AUTO: 90.2 FL (ref 81–99)
MONOCYTES # BLD AUTO: 0.62 10*3/MM3 (ref 0–1)
MONOCYTES NFR BLD AUTO: 6.1 % (ref 3–8)
NEUTROPHILS # BLD AUTO: 7.23 10*3/MM3 (ref 1.5–8.3)
NEUTROPHILS NFR BLD AUTO: 71.5 % (ref 45–70)
NRBC BLD MANUAL-RTO: 0 /100 WBC (ref 0–0)
PLATELET # BLD AUTO: 170 10*3/MM3 (ref 140–500)
PMV BLD AUTO: 12 FL (ref 7.4–10.4)
POTASSIUM BLD-SCNC: 3.6 MMOL/L (ref 3.5–5.2)
PROT SERPL-MCNC: 6.3 G/DL (ref 6–8.5)
RBC # BLD AUTO: 3.88 10*6/MM3 (ref 4.2–5.4)
SODIUM BLD-SCNC: 138 MMOL/L (ref 136–145)
WBC NRBC COR # BLD: 10.12 10*3/MM3 (ref 4.8–10.8)

## 2018-07-12 PROCEDURE — 76775 US EXAM ABDO BACK WALL LIM: CPT

## 2018-07-12 PROCEDURE — 25010000003 CEFAZOLIN PER 500 MG: Performed by: OBSTETRICS & GYNECOLOGY

## 2018-07-12 PROCEDURE — 83605 ASSAY OF LACTIC ACID: CPT | Performed by: OBSTETRICS & GYNECOLOGY

## 2018-07-12 PROCEDURE — 80053 COMPREHEN METABOLIC PANEL: CPT | Performed by: OBSTETRICS & GYNECOLOGY

## 2018-07-12 PROCEDURE — 25010000002 FENTANYL CITRATE (PF) 100 MCG/2ML SOLUTION: Performed by: EMERGENCY MEDICINE

## 2018-07-12 PROCEDURE — 87086 URINE CULTURE/COLONY COUNT: CPT | Performed by: EMERGENCY MEDICINE

## 2018-07-12 PROCEDURE — 85025 COMPLETE CBC W/AUTO DIFF WBC: CPT | Performed by: OBSTETRICS & GYNECOLOGY

## 2018-07-12 PROCEDURE — 87186 SC STD MICRODIL/AGAR DIL: CPT | Performed by: EMERGENCY MEDICINE

## 2018-07-12 RX ORDER — DEXTROSE, SODIUM CHLORIDE, SODIUM LACTATE, POTASSIUM CHLORIDE, AND CALCIUM CHLORIDE 5; .6; .31; .03; .02 G/100ML; G/100ML; G/100ML; G/100ML; G/100ML
INJECTION, SOLUTION INTRAVENOUS
Status: COMPLETED
Start: 2018-07-12 | End: 2018-07-12

## 2018-07-12 RX ORDER — OXYCODONE HYDROCHLORIDE AND ACETAMINOPHEN 5; 325 MG/1; MG/1
2 TABLET ORAL EVERY 4 HOURS PRN
Status: DISCONTINUED | OUTPATIENT
Start: 2018-07-12 | End: 2018-07-14 | Stop reason: HOSPADM

## 2018-07-12 RX ORDER — POLYETHYLENE GLYCOL 3350 17 G/17G
17 POWDER, FOR SOLUTION ORAL DAILY
Status: DISCONTINUED | OUTPATIENT
Start: 2018-07-12 | End: 2018-07-14 | Stop reason: HOSPADM

## 2018-07-12 RX ORDER — OXYCODONE HYDROCHLORIDE AND ACETAMINOPHEN 5; 325 MG/1; MG/1
TABLET ORAL
Status: COMPLETED
Start: 2018-07-12 | End: 2018-07-12

## 2018-07-12 RX ORDER — PRENATAL VIT/IRON FUM/FOLIC AC 27MG-0.8MG
1 TABLET ORAL DAILY
Status: DISCONTINUED | OUTPATIENT
Start: 2018-07-12 | End: 2018-07-14 | Stop reason: HOSPADM

## 2018-07-12 RX ORDER — FENTANYL CITRATE 50 UG/ML
50 INJECTION, SOLUTION INTRAMUSCULAR; INTRAVENOUS ONCE
Status: COMPLETED | OUTPATIENT
Start: 2018-07-12 | End: 2018-07-12

## 2018-07-12 RX ORDER — DOCUSATE SODIUM 100 MG/1
100 CAPSULE, LIQUID FILLED ORAL 2 TIMES DAILY
Status: DISCONTINUED | OUTPATIENT
Start: 2018-07-12 | End: 2018-07-14 | Stop reason: HOSPADM

## 2018-07-12 RX ORDER — DEXTROSE, SODIUM CHLORIDE, SODIUM LACTATE, POTASSIUM CHLORIDE, AND CALCIUM CHLORIDE 5; .6; .31; .03; .02 G/100ML; G/100ML; G/100ML; G/100ML; G/100ML
150 INJECTION, SOLUTION INTRAVENOUS CONTINUOUS
Status: DISCONTINUED | OUTPATIENT
Start: 2018-07-12 | End: 2018-07-14 | Stop reason: HOSPADM

## 2018-07-12 RX ORDER — ONDANSETRON 2 MG/ML
4 INJECTION INTRAMUSCULAR; INTRAVENOUS EVERY 6 HOURS PRN
Status: DISCONTINUED | OUTPATIENT
Start: 2018-07-12 | End: 2018-07-14 | Stop reason: HOSPADM

## 2018-07-12 RX ORDER — PRENATAL VIT/IRON FUM/FOLIC AC 27MG-0.8MG
TABLET ORAL
Status: COMPLETED
Start: 2018-07-12 | End: 2018-07-12

## 2018-07-12 RX ADMIN — OXYCODONE HYDROCHLORIDE AND ACETAMINOPHEN 2 TABLET: 5; 325 TABLET ORAL at 19:00

## 2018-07-12 RX ADMIN — OXYCODONE HYDROCHLORIDE AND ACETAMINOPHEN 2 TABLET: 5; 325 TABLET ORAL at 01:50

## 2018-07-12 RX ADMIN — PRENATAL VIT W/ FE FUMARATE-FA TAB 27-0.8 MG 1 TABLET: 27-0.8 TAB at 07:41

## 2018-07-12 RX ADMIN — CEFAZOLIN SODIUM 1 G: 1 SOLUTION INTRAVENOUS at 10:02

## 2018-07-12 RX ADMIN — POLYETHYLENE GLYCOL (3350) 17 G: 17 POWDER, FOR SOLUTION ORAL at 10:09

## 2018-07-12 RX ADMIN — SODIUM CHLORIDE, SODIUM LACTATE, POTASSIUM CHLORIDE, CALCIUM CHLORIDE AND DEXTROSE MONOHYDRATE 150 ML/HR: 5; 600; 310; 30; 20 INJECTION, SOLUTION INTRAVENOUS at 01:55

## 2018-07-12 RX ADMIN — SODIUM CHLORIDE, SODIUM LACTATE, POTASSIUM CHLORIDE, CALCIUM CHLORIDE AND DEXTROSE MONOHYDRATE 150 ML/HR: 5; 600; 310; 30; 20 INJECTION, SOLUTION INTRAVENOUS at 23:28

## 2018-07-12 RX ADMIN — OXYCODONE HYDROCHLORIDE AND ACETAMINOPHEN 2 TABLET: 5; 325 TABLET ORAL at 14:02

## 2018-07-12 RX ADMIN — DOCUSATE SODIUM 100 MG: 100 CAPSULE, LIQUID FILLED ORAL at 10:04

## 2018-07-12 RX ADMIN — FENTANYL CITRATE 50 MCG: 50 INJECTION, SOLUTION INTRAMUSCULAR; INTRAVENOUS at 01:06

## 2018-07-12 RX ADMIN — DOCUSATE SODIUM 100 MG: 100 CAPSULE, LIQUID FILLED ORAL at 20:57

## 2018-07-12 RX ADMIN — SODIUM CHLORIDE, SODIUM LACTATE, POTASSIUM CHLORIDE, CALCIUM CHLORIDE AND DEXTROSE MONOHYDRATE 150 ML/HR: 5; 600; 310; 30; 20 INJECTION, SOLUTION INTRAVENOUS at 16:29

## 2018-07-12 RX ADMIN — SODIUM CHLORIDE, SODIUM LACTATE, POTASSIUM CHLORIDE, CALCIUM CHLORIDE AND DEXTROSE MONOHYDRATE 150 ML/HR: 5; 600; 310; 30; 20 INJECTION, SOLUTION INTRAVENOUS at 09:45

## 2018-07-12 RX ADMIN — CEFAZOLIN SODIUM 1 G: 1 SOLUTION INTRAVENOUS at 18:57

## 2018-07-12 RX ADMIN — OXYCODONE HYDROCHLORIDE AND ACETAMINOPHEN 2 TABLET: 5; 325 TABLET ORAL at 07:42

## 2018-07-12 RX ADMIN — SODIUM CHLORIDE 1000 ML: 9 INJECTION, SOLUTION INTRAVENOUS at 00:05

## 2018-07-12 RX ADMIN — OXYCODONE HYDROCHLORIDE AND ACETAMINOPHEN 2 TABLET: 5; 325 TABLET ORAL at 23:40

## 2018-07-12 NOTE — PLAN OF CARE
Problem: Patient Care Overview  Goal: Plan of Care Review  Outcome: Ongoing (interventions implemented as appropriate)   07/12/18 1422   Coping/Psychosocial   Plan of Care Reviewed With patient   Plan of Care Review   Progress no change      07/12/18 1422   Coping/Psychosocial   Plan of Care Reviewed With patient   Plan of Care Review   Progress no change       Problem: Pain, Acute (Adult)  Goal: Identify Related Risk Factors and Signs and Symptoms  Outcome: Ongoing (interventions implemented as appropriate)   07/12/18 1422   Pain, Acute (Adult)   Related Risk Factors (Acute Pain) infection;persistent pain;positioning   Signs and Symptoms (Acute Pain) pacing/restlessness;sleep pattern alteration;verbalization of pain descriptors       Problem: Infection, Risk/Actual (Adult)  Goal: Identify Related Risk Factors and Signs and Symptoms   07/12/18 1422   Infection, Risk/Actual (Adult)   Related Risk Factors (Infection, Risk/Actual) medication effects;sleep disturbance;treatment plan;exposure to microbes   Signs and Symptoms (Infection, Risk/Actual) body temperature changes;chills;skin cool/clammy;diaphoresis;feeding/eating intoleran   07/12/18 1422   Infection, Risk/Actual (Adult)   Related Risk Factors (Infection, Risk/Actual) medication effects;sleep disturbance;treatment plan;exposure to microbes   Signs and Symptoms (Infection, Risk/Actual) body temperature changes;chills;skin cool/clammy;diaphoresis;feeding/eating intolerance;pain   ce;pain

## 2018-07-12 NOTE — ED PROVIDER NOTES
EMERGENCY DEPARTMENT ENCOUNTER      Room Number: 3/03      HPI:    Chief complaint: Nausea, vomiting, fever, left upper quadrant abdominal discomfort    Location: As above    Quality/Severity:  Moderate    Timing/Duration: Began a week ago but had resolved, recurrence ×2 days    Modifying Factors: None clearly identified, no recent Tylenol    Associated Symptoms: Frequent urination however no dysuria is reported.  Nausea and vomiting with poor oral tolerance.  Denies cough, sore throat, new rash.  No vaginal complaints or abdominal cramping reported.  No bowel movement ×1 week.    Narrative: Pt is a 23 y.o. female  at approx 14+3 weeks who presents complaining of Gi symptoms and fever      PMD: Kristen Hamilton MD    REVIEW OF SYSTEMS  Review of Systems  All other systems reviewed are otherwise negative as related chief complaint.  PAST MEDICAL HISTORY  Active Ambulatory Problems     Diagnosis Date Noted   • Pregnant 2016   • Anemia, postpartum 2016   • Normal labor 2016   • Rh negative status during pregnancy 2016   • Vaginal delivery 2016   • Initial obstetric visit in first trimester 2018   • BMI 40.0-44.9, adult (CMS/HCC) 2018   • Nausea and vomiting during pregnancy prior to 22 weeks gestation 2018   • Encounter for supervision of normal pregnancy in multigravida, antepartum 2018     Resolved Ambulatory Problems     Diagnosis Date Noted   • No Resolved Ambulatory Problems     Past Medical History:   Diagnosis Date   • Asthma    • Rosacea    • UTI (urinary tract infection)        PAST SURGICAL HISTORY  Past Surgical History:   Procedure Laterality Date   • TONSILLECTOMY AND ADENOIDECTOMY     • WISDOM TOOTH EXTRACTION Bilateral        FAMILY HISTORY  Family History   Problem Relation Age of Onset   • Diabetes Mother    • Heart attack Father    • Cancer Sister    • Diabetes Maternal Grandmother    • Cancer Maternal Grandmother    • Diabetes Maternal  Grandfather    • Stroke Maternal Grandfather    • Heart attack Maternal Grandfather        SOCIAL HISTORY  Social History     Social History   • Marital status: Single     Spouse name: N/A   • Number of children: N/A   • Years of education: N/A     Occupational History   • Not on file.     Social History Main Topics   • Smoking status: Former Smoker     Packs/day: 1.00     Years: 6.00     Types: Cigarettes     Quit date: 4/2/2016   • Smokeless tobacco: Not on file   • Alcohol use No   • Drug use: No   • Sexual activity: Yes     Partners: Male     Other Topics Concern   • Not on file     Social History Narrative   • No narrative on file       ALLERGIES  Patient has no known allergies.    PHYSICAL EXAM  ED Triage Vitals [07/11/18 2240]   Temp Heart Rate Resp BP SpO2   (!) 101.2 °F (38.4 °C) (!) 125 18 125/82 99 %      Temp src Heart Rate Source Patient Position BP Location FiO2 (%)   -- -- -- -- --       Physical Exam   Constitutional: She is oriented to person, place, and time and well-developed, well-nourished, and in no distress. No distress.   HENT:   Head: Normocephalic.   Mouth/Throat: No oropharyngeal exudate.   Mucous membranes moist   Eyes: No scleral icterus.   Neck: Neck supple.   Painless range of motion; no meningismus or rigidity   Cardiovascular: Normal rate and regular rhythm.    Pulmonary/Chest: Effort normal and breath sounds normal. No respiratory distress.   Abdominal: Soft. Tenderness: Large habitus.  minimal left upper quadrant tenderness without any rebound or rigidity.  Negative Conner's.  No discomfort at McBurney's point.   Musculoskeletal: She exhibits no edema or tenderness.   Moves all extremities equally   Neurological: She is alert and oriented to person, place, and time. GCS score is 15.   Skin: Skin is warm and dry.   Facial rash consistent with history of rosacea.   Psychiatric: Mood and affect normal.   Nursing note and vitals reviewed.      LAB RESULTS  Results for orders placed or  performed during the hospital encounter of 07/11/18   Comprehensive Metabolic Panel   Result Value Ref Range    Glucose 113 (H) 65 - 99 mg/dL    BUN 4 (L) 6 - 20 mg/dL    Creatinine 0.56 (L) 0.57 - 1.00 mg/dL    Sodium 140 136 - 145 mmol/L    Potassium 3.7 3.5 - 5.2 mmol/L    Chloride 100 98 - 107 mmol/L    CO2 23.8 22.0 - 29.0 mmol/L    Calcium 9.4 8.6 - 10.5 mg/dL    Total Protein 7.1 6.0 - 8.5 g/dL    Albumin 3.80 3.50 - 5.20 g/dL    ALT (SGPT) 11 5 - 33 U/L    AST (SGOT) 12 5 - 32 U/L    Alkaline Phosphatase 126 40 - 129 U/L    Total Bilirubin 0.6 0.2 - 1.2 mg/dL    eGFR Non African Amer 135 >60 mL/min/1.73    Globulin 3.3 gm/dL    A/G Ratio 1.2 g/dL    BUN/Creatinine Ratio 7.1 7.0 - 25.0    Anion Gap 16.2 mmol/L   Lactic Acid, Plasma   Result Value Ref Range    Lactate 0.9 0.5 - 2.0 mmol/L   Urinalysis With Microscopic If Indicated (No Culture) - Urine, Clean Catch   Result Value Ref Range    Color, UA Dark Yellow (A) Yellow, Straw    Appearance, UA Cloudy (A) Clear    pH, UA 6.0 4.5 - 8.0    Specific Gravity, UA 1.020 1.003 - 1.030    Glucose, UA Negative Negative    Ketones, UA >=160 mg/dL (4+) Negative, 80 mg/dL (3+), >=160 mg/dL (4+)    Bilirubin, UA Small (1+) (A) Negative    Blood, UA Moderate (2+) (A) Negative    Protein, UA 30 mg/dL (1+) (A) Negative    Leuk Esterase, UA Moderate (2+) (A) Negative    Nitrite, UA Positive (A) Negative    Urobilinogen, UA >=8.0 E.U./dL (A) 0.2 - 1.0 E.U./dL   CBC Auto Differential   Result Value Ref Range    WBC 13.97 (H) 4.80 - 10.80 10*3/mm3    RBC 4.02 (L) 4.20 - 5.40 10*6/mm3    Hemoglobin 12.4 12.0 - 16.0 g/dL    Hematocrit 35.3 (L) 37.0 - 47.0 %    MCV 87.8 81.0 - 99.0 fL    MCH 30.8 27.0 - 31.0 pg    MCHC 35.1 31.0 - 37.0 g/dL    RDW 13.2 11.5 - 14.5 %    RDW-SD 41.8 37.0 - 54.0 fl    MPV 12.5 (H) 7.4 - 10.4 fL    Platelets 193 140 - 500 10*3/mm3    Neutrophil % 83.9 (H) 45.0 - 70.0 %    Lymphocyte % 10.2 (L) 20.0 - 45.0 %    Monocyte % 5.3 3.0 - 8.0 %     Eosinophil % 0.1 0.0 - 4.0 %    Basophil % 0.1 0.0 - 2.0 %    Immature Grans % 0.4 0.0 - 0.5 %    Neutrophils, Absolute 11.74 (H) 1.50 - 8.30 10*3/mm3    Lymphocytes, Absolute 1.42 0.60 - 4.80 10*3/mm3    Monocytes, Absolute 0.74 0.00 - 1.00 10*3/mm3    Eosinophils, Absolute 0.01 (L) 0.10 - 0.30 10*3/mm3    Basophils, Absolute 0.01 0.00 - 0.20 10*3/mm3    Immature Grans, Absolute 0.05 (H) 0.00 - 0.03 10*3/mm3    nRBC 0.0 0.0 - 0.0 /100 WBC   Lipase   Result Value Ref Range    Lipase 22 13 - 60 U/L   Urinalysis, Microscopic Only - Urine, Clean Catch   Result Value Ref Range    RBC, UA 3-5 (A) None Seen /HPF    WBC, UA 3-5 (A) None Seen /HPF    Bacteria, UA 4+ (A) None Seen /HPF    Squamous Epithelial Cells, UA 0-2 None Seen, 0-2 /HPF    Hyaline Casts, UA None Seen None Seen /LPF    Methodology Manual Light Microscopy          I ordered the above labs and reviewed the results    RADIOLOGY  Us Ob < 14 Weeks Single Or First Gestation    Result Date: 6/18/2018  Impression: Ordering physician's impression is located in the Encounter Note dated 06/18/18.       I ordered the above radiologic testing and reviewed the results    PROCEDURES  Procedures      PROGRESS AND CONSULTS  ED Course as of Jul 12 0114   Thu Jul 12, 2018   0054 CONSULT        Provider: Dr. Denton - OB/GYN    Discussion: Reviewed patient history, 80 presentation and evaluation.  He accepts the patient as an observation admission    Agreeable c treatment and planned disposition.          [RS]   0101 Late entry:  After 2 L of fluids patient continues to be tachycardic.  Patient has ambulated to the bathroom continues to complain of bilateral flank pain and some headache in though vital signs have improved some, the patient's pregnancy, acute by this pyelonephritis concern to me enough that I recommend the possibility of admission.  Patient is agreeable with this plan.  [RS]      ED Course User Index  [RS] Terrell Freitas MD           MEDICAL DECISION  MAKING  Results were reviewed/discussed with the patient and they were also made aware of online access. Pt also made aware that some labs, such as cultures, will not be resulted during ER visit and follow up with PMD is necessary.     MDM  Number of Diagnoses or Management Options  14 weeks gestation of pregnancy:   Acute pyelonephritis:   Non-intractable vomiting with nausea, unspecified vomiting type:      Amount and/or Complexity of Data Reviewed  Clinical lab tests: reviewed and ordered  Decide to obtain previous medical records or to obtain history from someone other than the patient: yes  Review and summarize past medical records: yes  Discuss the patient with other providers: yes           DIAGNOSIS  Final diagnoses:   Acute pyelonephritis   Non-intractable vomiting with nausea, unspecified vomiting type   14 weeks gestation of pregnancy       Latest Documented Vital Signs:  As of 1:14 AM  BP- 109/61 HR- 110 Temp- 99.6 °F (37.6 °C) (Oral) O2 sat- 96%    DISPOSITION  Admit       Medication List      No changes were made to your prescriptions during this visit.                Terrell Freitas MD  07/12/18 0114

## 2018-07-12 NOTE — H&P
VERNON Barros  Obstetric History and Physical    Chief Complaint   Patient presents with   • Illness     15 weeks pregnant, fever, vomiting, headache, back pain, abd pain, no BM in over a week.       Subjective     Patient is a 23 y.o. female  currently at 14w4d, who presents from ER last night for back pain, fever.  Was diagnosed with bilateral pyelonephritis and admitted for antibiosis.    Patient Active Problem List   Diagnosis   • Pregnant   • Rh negative status during pregnancy   • BMI 40.0-44.9, adult (CMS/HCC)   • Nausea and vomiting during pregnancy prior to 22 weeks gestation   • Acute pyelonephritis   • Rosacea         The following portions of the patients history were reviewed and updated as appropriate: current medications, allergies, past medical history, past surgical history, past family history, past social history and problem list .       Prenatal Information:  Prenatal Results     Initial Prenatal Labs     Test Value Reference Range Date Time    Hemoglobin 11.8 g/dL (L) 12.0 - 16.0 g/dL 18 0703    Hematocrit 35.0 % (L) 37.0 - 47.0 % 18 0703    Platelets 170 10*3/mm3 140 - 500 10*3/mm3 18 0703    Rubella IgG 12.20 index Immune >0.99 index 18 1632    Hepatitis B SAg Negative  Negative 18 1632    Hepatitis C Ab <0.1 s/co ratio 0.0 - 0.9 s/co ratio 18 1632    RPR Non Reactive  Non Reactive 18 1632    ABO O   18 1632    Rh Negative   18 1632    Antibody Screen Negative  Negative 18 1632    HIV Non Reactive  Non Reactive 18 1632    Urine Culture Final report  (A)  18 1608    Gonorrhea        Chlamydia        TSH              2nd and 3rd Trimester     Test Value Reference Range Date Time    Hemoglobin (repeated)        Hematocrit (repeated)        GCT        Antibody Screen (repeated)        GTT Fasting        GTT 1 Hr        GTT 2 Hr        GTT 3 Hr        Group B Strep              Drug Screening     Test Value Reference  Range Date Time    Amphetamine Screen        Barbiturate Screen        Benzodiazepine Screen        Methadone Screen        Phencyclidine Screen        Opiates Screen        THC Screen        Cocaine Screen        Propoxyphene Screen        Buprenorphine Screen        Methamphetamine Screen        Oxycodone Screen        Tryicyclic Antidepressants Screen              Other (Risk screening)     Test Value Reference Range Date Time    Varicella IgG        Parvovirus IgG        CMV IgG        Cystic Fibrosis        Hemoglobin electrophoresis        NIPT        MSAFP-4        AFP (for NTD only)                  External Prenatal Results     Pregnancy Outside Results - Transcribed From Office Records - See Scanned Records For Details     Test Value Date Time    Hgb 11.8 g/dL (L) 07/12/18 0703    Hct 35.0 % (L) 07/12/18 0703    ABO O  06/18/18 1632    Rh Negative  06/18/18 1632    Antibody Screen Negative  06/18/18 1632    Glucose Fasting GTT       Glucose Tolerance Test 1 hour       Glucose Tolerance Test 3 hour       Gonorrhea (discrete)       Chlamydia (discrete)       RPR Non Reactive  06/18/18 1632    VDRL       Syphilis Antibody       Rubella 12.20 index 06/18/18 1632    HBsAg Negative  06/18/18 1632    Herpes Simplex Virus PCR       Herpes Simplex VIrus Culture       HIV Non Reactive  06/18/18 1632    Hep C RNA Quant PCR       Hep C Antibody <0.1 s/co ratio 06/18/18 1632    AFP       Group B Strep       GBS Susceptibility to Clindamycin       GBS Susceptibility to Erythromycin       Fetal Fibronectin       Genetic Testing, Maternal Blood             Drug Screening     Test Value Date Time    Urine Drug Screen       Amphetamine Screen Negative  11/16/16 0110    Barbiturate Screen Negative  11/16/16 0110    Benzodiazepine Screen Negative  11/16/16 0110    Methadone Screen Negative  11/16/16 0110    Phencyclidine Screen Negative  11/16/16 0110    Opiates Screen Negative  11/16/16 0110    THC Screen Negative  11/16/16  0110    Cocaine Screen       Propoxyphene Screen Negative  16 0110    Buprenorphine Screen Negative  16 011    Methamphetamine Screen       Oxycodone Screen Negative  16 0110    Tricyclic Antidepressants Screen Negative  16 011                 Past OB History:     Obstetric History       T3      L3     SAB0   TAB0   Ectopic0   Molar0   Multiple0   Live Births3       # Outcome Date GA Lbr Eliseo/2nd Weight Sex Delivery Anes PTL Lv   4 Current            3 Term 16 39w1d 14:57 / 00:04  M Vag-Spont EPI N JAMIE      Name: DONALD FRENCH      Apgar1:  8                Apgar5: 9   2 Term 04/14/15 40w0d  3969 g (8 lb 12 oz) M Vag-Spont EPI N JAMIE      Name: Cordell   1 Term 13 40w0d  3317 g (7 lb 5 oz) F Vag-Spont EPI N JAMIE      Name: Caitlyn          Past Medical History: Past Medical History:   Diagnosis Date   • Asthma    • Rosacea    • UTI (urinary tract infection)       Past Surgical History Past Surgical History:   Procedure Laterality Date   • TONSILLECTOMY AND ADENOIDECTOMY     • WISDOM TOOTH EXTRACTION Bilateral       Family History: Family History   Problem Relation Age of Onset   • Diabetes Mother    • Heart attack Father    • Cancer Sister    • Diabetes Maternal Grandmother    • Cancer Maternal Grandmother    • Diabetes Maternal Grandfather    • Stroke Maternal Grandfather    • Heart attack Maternal Grandfather       Social History:  reports that she quit smoking about 2 years ago. Her smoking use included Cigarettes. She has a 6.00 pack-year smoking history. She does not have any smokeless tobacco history on file.   reports that she does not drink alcohol.   reports that she does not use drugs.        Review of Systems   Constitutional: Positive for chills and fever.   HENT: Negative.    Eyes: Negative.    Respiratory: Negative.    Cardiovascular: Negative.    Gastrointestinal: Negative.    Genitourinary: Negative.    Musculoskeletal: Positive for back pain.    Neurological: Negative.    Hematological: Negative.    Psychiatric/Behavioral: Negative.          Objective     Vital Signs Range for the last 24 hours  Temperature: Temp:  [98.6 °F (37 °C)-101.8 °F (38.8 °C)] 101.8 °F (38.8 °C)   Temp Source: Temp src: Oral   BP: BP: ()/(43-82) 140/72   Pulse: Heart Rate:  [] 121   Respirations: Resp:  [18-20] 18   SPO2: SpO2:  [96 %-100 %] 100 %   O2 Amount (l/min):     O2 Devices Device (Oxygen Therapy): room air   Weight: Weight:  [101 kg (223 lb)] 101 kg (223 lb)     Physical Examination: General appearance - alert, well appearing, and in no distress, oriented to person, place, and time, acyanotic, in no respiratory distress and ill-appearing  Mental status - alert, oriented to person, place, and time, affect appropriate to mood, drowsy  Eyes - pupils equal and reactive, extraocular eye movements intact, left eye normal, right eye normal  Mouth - mucous membranes moist, pharynx normal without lesions  Chest - clear to auscultation, no wheezes, rales or rhonchi, symmetric air entry, no tachypnea, retractions or cyanosis  Heart - normal rate and regular rhythm  Abdomen - soft, nontender, nondistended, no masses or organomegaly  Back exam - tenderness noted bilaterally, +CVAT  Neurological - alert, oriented, normal speech, no focal findings or movement disorder noted  Musculoskeletal - no joint tenderness, deformity or swelling  Extremities - peripheral pulses normal, no pedal edema, no clubbing or cyanosis  Skin - rosacea noted on face.  +FHTs obtained through admission.    Laboratory Results: reviewed.  Wbc decreasing  Radiology Review: none  Other Studies: none    Assessment/Plan     Active Problems:    Pregnant    Rh negative status during pregnancy    BMI 40.0-44.9, adult (CMS/formerly [facility])    Acute pyelonephritis    Rosacea      Assessment & Plan    23 y.o.  @ 14w4d  Acute pyelonephritis, stable with fever  Continue antibiosis, fever and pain control, await  cultures.     Time:   Total face-to-face/floor time 40 min.  Time spent in counseling 20 min. Counseling included the following topics with prognosis, differential diagnosis, risks, benefits of treatment, instructions, complicance and/or risk reduction and alternatives: pyelonephritis, treatment, pregnancy surveillance.           Logan Denton MD  7/12/2018  8:33 AM

## 2018-07-12 NOTE — PLAN OF CARE
Problem: Patient Care Overview  Goal: Plan of Care Review  Outcome: Ongoing (interventions implemented as appropriate)   07/12/18 0313   Coping/Psychosocial   Plan of Care Reviewed With patient   Plan of Care Review   Progress improving   OTHER   Outcome Summary Monitoring Fetal heart tones per doppler, pain control, VSS     Goal: Individualization and Mutuality  Outcome: Ongoing (interventions implemented as appropriate)    Goal: Discharge Needs Assessment  Outcome: Ongoing (interventions implemented as appropriate)    Goal: Interprofessional Rounds/Family Conf  Outcome: Ongoing (interventions implemented as appropriate)      Problem: Pain, Acute (Adult)  Goal: Identify Related Risk Factors and Signs and Symptoms  Outcome: Ongoing (interventions implemented as appropriate)    Goal: Acceptable Pain Control/Comfort Level  Outcome: Ongoing (interventions implemented as appropriate)

## 2018-07-13 PROCEDURE — 99232 SBSQ HOSP IP/OBS MODERATE 35: CPT | Performed by: OBSTETRICS & GYNECOLOGY

## 2018-07-13 PROCEDURE — 25010000002 ONDANSETRON PER 1 MG: Performed by: OBSTETRICS & GYNECOLOGY

## 2018-07-13 PROCEDURE — 25010000003 CEFAZOLIN PER 500 MG: Performed by: OBSTETRICS & GYNECOLOGY

## 2018-07-13 RX ADMIN — DOCUSATE SODIUM 100 MG: 100 CAPSULE, LIQUID FILLED ORAL at 20:35

## 2018-07-13 RX ADMIN — OXYCODONE HYDROCHLORIDE AND ACETAMINOPHEN 2 TABLET: 5; 325 TABLET ORAL at 18:52

## 2018-07-13 RX ADMIN — CEFAZOLIN SODIUM 1 G: 1 SOLUTION INTRAVENOUS at 18:48

## 2018-07-13 RX ADMIN — CEFAZOLIN SODIUM 1 G: 1 SOLUTION INTRAVENOUS at 02:27

## 2018-07-13 RX ADMIN — SODIUM CHLORIDE, SODIUM LACTATE, POTASSIUM CHLORIDE, CALCIUM CHLORIDE AND DEXTROSE MONOHYDRATE 150 ML/HR: 5; 600; 310; 30; 20 INJECTION, SOLUTION INTRAVENOUS at 07:18

## 2018-07-13 RX ADMIN — PRENATAL VIT W/ FE FUMARATE-FA TAB 27-0.8 MG 1 TABLET: 27-0.8 TAB at 09:54

## 2018-07-13 RX ADMIN — CEFAZOLIN SODIUM 1 G: 1 SOLUTION INTRAVENOUS at 10:49

## 2018-07-13 RX ADMIN — ONDANSETRON 4 MG: 2 INJECTION, SOLUTION INTRAMUSCULAR; INTRAVENOUS at 08:21

## 2018-07-13 RX ADMIN — OXYCODONE HYDROCHLORIDE AND ACETAMINOPHEN 2 TABLET: 5; 325 TABLET ORAL at 09:55

## 2018-07-13 RX ADMIN — SODIUM CHLORIDE, SODIUM LACTATE, POTASSIUM CHLORIDE, CALCIUM CHLORIDE AND DEXTROSE MONOHYDRATE 150 ML/HR: 5; 600; 310; 30; 20 INJECTION, SOLUTION INTRAVENOUS at 14:45

## 2018-07-13 RX ADMIN — SODIUM CHLORIDE, SODIUM LACTATE, POTASSIUM CHLORIDE, CALCIUM CHLORIDE AND DEXTROSE MONOHYDRATE 150 ML/HR: 5; 600; 310; 30; 20 INJECTION, SOLUTION INTRAVENOUS at 20:35

## 2018-07-13 RX ADMIN — DOCUSATE SODIUM 100 MG: 100 CAPSULE, LIQUID FILLED ORAL at 09:55

## 2018-07-13 RX ADMIN — OXYCODONE HYDROCHLORIDE AND ACETAMINOPHEN 2 TABLET: 5; 325 TABLET ORAL at 23:25

## 2018-07-13 NOTE — PROGRESS NOTES
"Patient Care Team:  Kristen Hamilton MD as PCP - General (Family Medicine)        Chief Complaint:  pyelonephritis    Subjective    24yo admitted with pyelonephritis at 14.5 weeks. Pt still with left sided flank pain but states she feels better than yesterday.      Objective    Vital Signs  Temp:  [97.8 °F (36.6 °C)-100.8 °F (38.2 °C)] 98.4 °F (36.9 °C)  Heart Rate:  [] 89  Resp:  [17-18] 17  BP: (111)/(59-62) 111/59    Flowsheet Rows      First Filed Value   Admission Height  170.2 cm (67\") Documented at 07/11/2018 2240   Admission Weight  101 kg (223 lb) Documented at 07/11/2018 2240          Physical Exam:  General Appearance: alert, appears stated age, cooperative and overweight  Back: left flank pain with palpation  Lungs: clear to auscultation, respirations regular, respirations even and respirations unlabored  Heart: regular rhythm & normal rate, normal S1, S2, no murmur, no ricco, no rub and no click  Abdomen: normal bowel sounds, no masses, no hepatomegaly, no splenomegaly, soft non-tender, no guarding and no rebound tenderness  Extremities: moves extremities well, no edema, no cyanosis and no redness  Neurologic: Mental Status orientated to person, place, time and situation, alertness alert and awake, orientation time, date, person and place, mood/affect normal and thought content exhibits organized  Psych: normal    Results Review:     I reviewed the patient's new clinical results.    Lab Results (last 24 hours)     Procedure Component Value Units Date/Time    Urine Culture - Urine, Urine, Clean Catch [[id number]]  (Abnormal) Collected:  07/12/18 0543    Specimen:  Urine from Urine, Clean Catch Updated:  07/13/18 0902     Urine Culture >100,000 CFU/mL Gram Negative Bacilli (A)          Imaging Results (last 24 hours)     ** No results found for the last 24 hours. **          Xray not reviewed personally by physician.      ECG not reviewed personally by physician  ECG/EMG Results (most recent)     " None          Medication Review:   I have reviewed the patient's current medication list    Current Facility-Administered Medications:   •  ceFAZolin (ANCEF) IVPB (duplex) 1 g, 1 g, Intravenous, Q8H, Logan Denton MD, Last Rate: 100 mL/hr at 18 1049, 1 g at 18 1049  •  dextrose 5 % and lactated Ringer's infusion, 150 mL/hr, Intravenous, Continuous, Logan Denton MD, Last Rate: 150 mL/hr at 18 0718, 150 mL/hr at 1818  •  docusate sodium (COLACE) capsule 100 mg, 100 mg, Oral, BID, Logan Denton MD, 100 mg at 18 0955  •  HYDROmorphone (DILAUDID) injection 1 mg, 1 mg, Intravenous, Once, Logan Denton MD  •  magnesium hydroxide (MILK OF MAGNESIA) suspension 2400 mg/10mL 10 mL, 10 mL, Oral, Once PRN, Cande Guzman MD  •  ondansetron (ZOFRAN) injection 4 mg, 4 mg, Intravenous, Q6H PRN, Logan Denton MD, 4 mg at 18 0821  •  oxyCODONE-acetaminophen (PERCOCET) 5-325 MG per tablet 2 tablet, 2 tablet, Oral, Q4H PRN, Logan Denton MD, 2 tablet at 18 0955  •  polyethylene glycol (MIRALAX) powder 17 g, 17 g, Oral, Daily, Logan Denton MD, 17 g at 18 1009  •  prenatal vitamin 27-0.8 tablet 1 tablet, 1 tablet, Oral, Daily, Logan Denton MD, 1 tablet at 18 0954  •  sodium chloride 0.9 % flush 10 mL, 10 mL, Intravenous, PRN, Terrell Freitas MD  •  sodium chloride 0.9 % infusion, 125 mL/hr, Intravenous, Continuous, Terrell Freitas MD      Assessment/Plan     24yo  at 14.5 weeks admitted with pyelonephritis    UCX reveals >100,000 Gram neg bacilli. Awaiting sensitivities.  Renal US negative.  WBC 10K  Last temp 100.6 at 0017 today.  Continue IV antibiotics until results of UCx return and then will start po antibiotics.  Discussed with pt that she needs to be afebrile for over 24 hrs prior to discharge.  Also discussed that she will need a treatment price of oral  antibiotics for 1 week and then will tracy to be on daily suppression for the duration of her pregnancy.          Plan for disposition:Possible discharge to home tomorrow    Kimberly Lin DO  07/13/18  1:24 PM

## 2018-07-13 NOTE — PLAN OF CARE
Problem: Patient Care Overview  Goal: Plan of Care Review  Outcome: Ongoing (interventions implemented as appropriate)  Fetal Heart Tones auscultated per orders. KENNY ThapaL. Patient's pain well controlled with PRN percocet.VSS. 07/13/2018 0609  Goal: Individualization and Mutuality  Outcome: Ongoing (interventions implemented as appropriate)    Goal: Discharge Needs Assessment  Outcome: Ongoing (interventions implemented as appropriate)    Goal: Interprofessional Rounds/Family Conf  Outcome: Ongoing (interventions implemented as appropriate)      Problem: Pain, Acute (Adult)  Goal: Identify Related Risk Factors and Signs and Symptoms  Outcome: Ongoing (interventions implemented as appropriate)    Goal: Acceptable Pain Control/Comfort Level  Outcome: Ongoing (interventions implemented as appropriate)      Problem: Infection, Risk/Actual (Adult)  Goal: Identify Related Risk Factors and Signs and Symptoms  Outcome: Ongoing (interventions implemented as appropriate)    Goal: Infection Prevention/Resolution  Outcome: Ongoing (interventions implemented as appropriate)

## 2018-07-14 VITALS
HEART RATE: 71 BPM | HEIGHT: 67 IN | WEIGHT: 223 LBS | OXYGEN SATURATION: 98 % | SYSTOLIC BLOOD PRESSURE: 98 MMHG | RESPIRATION RATE: 16 BRPM | BODY MASS INDEX: 35 KG/M2 | TEMPERATURE: 98.1 F | DIASTOLIC BLOOD PRESSURE: 58 MMHG

## 2018-07-14 LAB — BACTERIA SPEC AEROBE CULT: ABNORMAL

## 2018-07-14 PROCEDURE — 25010000003 CEFAZOLIN PER 500 MG: Performed by: OBSTETRICS & GYNECOLOGY

## 2018-07-14 PROCEDURE — 99238 HOSP IP/OBS DSCHRG MGMT 30/<: CPT | Performed by: OBSTETRICS & GYNECOLOGY

## 2018-07-14 RX ORDER — NITROFURANTOIN 25; 75 MG/1; MG/1
100 CAPSULE ORAL 2 TIMES DAILY
Qty: 30 CAPSULE | Refills: 3 | Status: SHIPPED | OUTPATIENT
Start: 2018-07-14 | End: 2018-07-21

## 2018-07-14 RX ADMIN — OXYCODONE HYDROCHLORIDE AND ACETAMINOPHEN 2 TABLET: 5; 325 TABLET ORAL at 03:32

## 2018-07-14 RX ADMIN — OXYCODONE HYDROCHLORIDE AND ACETAMINOPHEN 2 TABLET: 5; 325 TABLET ORAL at 09:19

## 2018-07-14 RX ADMIN — CEFAZOLIN SODIUM 1 G: 1 SOLUTION INTRAVENOUS at 02:20

## 2018-07-14 RX ADMIN — POLYETHYLENE GLYCOL (3350) 17 G: 17 POWDER, FOR SOLUTION ORAL at 11:09

## 2018-07-14 RX ADMIN — DOCUSATE SODIUM 100 MG: 100 CAPSULE, LIQUID FILLED ORAL at 09:19

## 2018-07-14 RX ADMIN — SODIUM CHLORIDE, SODIUM LACTATE, POTASSIUM CHLORIDE, CALCIUM CHLORIDE AND DEXTROSE MONOHYDRATE 150 ML/HR: 5; 600; 310; 30; 20 INJECTION, SOLUTION INTRAVENOUS at 04:30

## 2018-07-14 RX ADMIN — PRENATAL VIT W/ FE FUMARATE-FA TAB 27-0.8 MG 1 TABLET: 27-0.8 TAB at 09:19

## 2018-07-14 NOTE — PLAN OF CARE
Problem: Patient Care Overview  Goal: Discharge Needs Assessment  Outcome: Outcome(s) achieved Date Met: 07/14/18

## 2018-07-14 NOTE — PLAN OF CARE
Problem: Pain, Acute (Adult)  Goal: Acceptable Pain Control/Comfort Level  Outcome: Outcome(s) achieved Date Met: 07/14/18

## 2018-07-14 NOTE — PLAN OF CARE
Problem: Patient Care Overview  Goal: Interprofessional Rounds/Family Conf  Outcome: Outcome(s) achieved Date Met: 07/14/18

## 2018-07-14 NOTE — DISCHARGE SUMMARY
Date of Admission: 2018 10:44 PM      Date of Discharge:  2018    Admitting Service: [facility]    Admission Diagnosis: Pyelonephritis  Discharge Diagnosis: Pyelonephritis    Presenting Problem/History of Present Illness  Acute pyelonephritis [N10]  14 weeks gestation of pregnancy [Z3A.14]  Non-intractable vomiting with nausea, unspecified vomiting type [R11.2]  Acute pyelonephritis [N10]  Pyelonephritis affecting pregnancy [O23.00]       Hospital Course  Patient is a 23 y.o. female  presented with 14week IUP and fever and chills and left flank pain. Etiology of her fever was suspected to be from a pyelonephritis. Pt was started on IV antibiotics. Her Ucx has returned with > 100,000 E coli. WBC is normal. Renal US was negative.  Patient has improved on IV antibiotics.  She has been afebrile for almost 36 hours.  Her left flank pain is almost resolved.  Patient will be sent home on Macrobid by mouth twice a day for 7 days and then is instructed to take a suppression dose of daily Macrobid.  The susceptibilities from her urine culture reveal that the bacteria in her urine culture is susceptible to Macrobid  Patient was instructed to call with returning fever or chills her left flank pain.  Patient is discharged home today in stable condition.  She has a follow-up appointment for continued prenatal care next week..      Procedures Performed         Consults:   Consults     No orders found from 2018 to 2018.          Pertinent Test Results: UCx with >100,000 E coli    Condition on Discharge:  stable    Vital Signs  Temp:  [97.8 °F (36.6 °C)-98.2 °F (36.8 °C)] 98.1 °F (36.7 °C)  Heart Rate:  [68-77] 71  Resp:  [16-20] 16  BP: ()/(58-68) 98/58    Physical Exam:   General Appearance: alert, appears stated age, cooperative and overweight  Back: no kyphosis present, no scoliosis present, no skin lesions, erythema, or scars, no tenderness to percussion or palpation, range of motion normal and no  left CVA tenderness  Abdomen: normal bowel sounds, no masses, no hepatomegaly and no splenomegaly  Skin: rosacea of face  Neurologic: Mental Status orientated to person, place, time and situation, alertness alert and awake, orientation time, date, person and place, mood/affect normal and thought content exhibits organized  Psych: normal    Discharge Disposition  Home or Self Care    Discharge Medications     Discharge Medications      New Medications      Instructions Start Date   nitrofurantoin (macrocrystal-monohydrate) 100 MG capsule  Commonly known as:  MACROBID  Notes to patient:  07/14/2018 1200   100 mg, Oral, 2 Times Daily, Then take 1 pill ever day for the duration of pregnancy         Continue These Medications      Instructions Start Date   doxylamine-pyridoxine 10-10 MG tablet delayed-release EC tablet  Commonly known as:  DICLEGIS  Notes to patient:  PRN   2 tablets, Oral, Nightly PRN      Prenatal 27-1 27-1 MG tablet tablet  Notes to patient:  07/15 9am   1 tablet, Oral, Daily         Stop These Medications    doxylamine 25 MG tablet  Commonly known as:  UNISOM     ondansetron 4 MG tablet  Commonly known as:  ZOFRAN     vitamin B-6 25 MG tablet  Commonly known as:  PYRIDOXINE            Discharge Diet:     Activity at Discharge:   Activity Instructions     Activity as Tolerated             Follow-up Appointments  Future Appointments  Date Time Provider Department Center   7/18/2018 11:00 AM BLAINE Diane [facility] None     Additional Instructions for the Follow-ups that You Need to Schedule     Discharge Follow-up with Specified Provider: GRETA; 1 Week    As directed      To:  GRETA    Follow Up:  1 Week    Follow Up Details:  call with fever, nausea/vomiting, CVA tenderness               Test Results Pending at Discharge       Kimberly Lin DO  07/14/18  10:46 AM

## 2018-07-14 NOTE — PLAN OF CARE
Problem: Pain, Acute (Adult)  Goal: Identify Related Risk Factors and Signs and Symptoms  Outcome: Outcome(s) achieved Date Met: 07/14/18

## 2018-07-14 NOTE — PLAN OF CARE
Problem: Patient Care Overview  Goal: Plan of Care Review  Outcome: Ongoing (interventions implemented as appropriate)   07/14/18 0414   Coping/Psychosocial   Plan of Care Reviewed With patient   Plan of Care Review   Progress improving   OTHER   Outcome Summary VSS, monitoring heart tones daily, pain well controlled, fever and vitals monitored q 4     Goal: Individualization and Mutuality  Outcome: Ongoing (interventions implemented as appropriate)    Goal: Discharge Needs Assessment  Outcome: Ongoing (interventions implemented as appropriate)   07/14/18 0414   Discharge Needs Assessment   Readmission Within the Last 30 Days no previous admission in last 30 days   Concerns to be Addressed no discharge needs identified;denies needs/concerns at this time   Patient/Family Anticipates Transition to home   Patient/Family Anticipated Services at Transition none   Transportation Concerns car, none   Anticipated Changes Related to Illness none   Equipment Needed After Discharge none   Disability   Equipment Currently Used at Home none     Goal: Interprofessional Rounds/Family Conf  Outcome: Ongoing (interventions implemented as appropriate)      Problem: Pain, Acute (Adult)  Goal: Identify Related Risk Factors and Signs and Symptoms  Outcome: Ongoing (interventions implemented as appropriate)    Goal: Acceptable Pain Control/Comfort Level  Outcome: Ongoing (interventions implemented as appropriate)   07/14/18 0414   Pain, Acute (Adult)   Acceptable Pain Control/Comfort Level making progress toward outcome       Problem: Infection, Risk/Actual (Adult)  Goal: Identify Related Risk Factors and Signs and Symptoms  Outcome: Ongoing (interventions implemented as appropriate)   07/14/18 0414   Infection, Risk/Actual (Adult)   Related Risk Factors (Infection, Risk/Actual) medication effects   Signs and Symptoms (Infection, Risk/Actual) diaphoresis     Goal: Infection Prevention/Resolution  Outcome: Ongoing (interventions  implemented as appropriate)   07/14/18 0414   Infection, Risk/Actual (Adult)   Infection Prevention/Resolution making progress toward outcome

## 2018-07-14 NOTE — PLAN OF CARE
Problem: Infection, Risk/Actual (Adult)  Goal: Infection Prevention/Resolution  Outcome: Outcome(s) achieved Date Met: 07/14/18

## 2018-07-14 NOTE — PLAN OF CARE
Problem: Infection, Risk/Actual (Adult)  Goal: Identify Related Risk Factors and Signs and Symptoms  Outcome: Outcome(s) achieved Date Met: 07/14/18

## 2018-07-14 NOTE — PLAN OF CARE
Problem: Patient Care Overview  Goal: Plan of Care Review  Outcome: Outcome(s) achieved Date Met: 07/14/18    Goal: Individualization and Mutuality  Outcome: Outcome(s) achieved Date Met: 07/14/18

## 2018-07-18 ENCOUNTER — ROUTINE PRENATAL (OUTPATIENT)
Dept: OBSTETRICS AND GYNECOLOGY | Facility: CLINIC | Age: 23
End: 2018-07-18

## 2018-07-18 VITALS — DIASTOLIC BLOOD PRESSURE: 70 MMHG | BODY MASS INDEX: 34.61 KG/M2 | SYSTOLIC BLOOD PRESSURE: 120 MMHG | WEIGHT: 221 LBS

## 2018-07-18 DIAGNOSIS — O26.892 RH NEGATIVE STATUS DURING PREGNANCY IN SECOND TRIMESTER: ICD-10-CM

## 2018-07-18 DIAGNOSIS — R63.8 INCREASED BMI: ICD-10-CM

## 2018-07-18 DIAGNOSIS — Z3A.15 15 WEEKS GESTATION OF PREGNANCY: ICD-10-CM

## 2018-07-18 DIAGNOSIS — Z67.91 RH NEGATIVE STATUS DURING PREGNANCY IN SECOND TRIMESTER: ICD-10-CM

## 2018-07-18 DIAGNOSIS — O23.02 PYELONEPHRITIS AFFECTING PREGNANCY IN SECOND TRIMESTER: ICD-10-CM

## 2018-07-18 DIAGNOSIS — Z36.9 ENCOUNTER FOR ANTENATAL SCREENING, UNSPECIFIED: Primary | ICD-10-CM

## 2018-07-18 PROCEDURE — 99214 OFFICE O/P EST MOD 30 MIN: CPT | Performed by: NURSE PRACTITIONER

## 2018-07-18 NOTE — PROGRESS NOTES
OB follow up     CC:  Here for prenatal follow up    Adamaris Whitehead is a 23 y.o.  15w3d being seen today for her obstetrical visit.  Patient reports her back pain and fever have resolved. She is finishing her BID Macrobid.  She denies fever and back pain. Taking +PNV.     Review of Systems  No bleeding. No cramping/contractions.    /70   Wt 100 kg (221 lb)   BMI 34.61 kg/m²     FHT: 140s  BPM   Uterine Size: size equals dates   Assessment    1) Pregnancy at 15w3d- Desires AFP today    2) Nausea and vomiting- Taking Vit B6 and Unisom. Nausea is managed fairly well  3) Pyelo- Taking Macrobid. She understands she will be on the antibx for the remainder of the pregnancy.   4) Increased BMI- Women with a pre-pregnancy BMI of 30+ should strive to gain approx 11-20 pounds for the entire pregnancy.       Plan    Continue prenatal vitamins   Reviewed this stage of pregnancy  Problem list updated   Follow up in 4 weeks for anatomy US     Beena Haddad, BLAINE     2018  11:03 AM

## 2018-07-24 LAB
2ND TRIMESTER 4 SCREEN SERPL-IMP: NORMAL
2ND TRIMESTER 4 SCREEN SERPL-IMP: NORMAL
AFP ADJ MOM SERPL: 0.48
AFP SERPL-MCNC: 11.2 NG/ML
AGE AT DELIVERY: 22.9 YR
FET TS 18 RISK FROM MAT AGE: NORMAL
FET TS 21 RISK FROM MAT AGE: 1106
GA METHOD: NORMAL
GA: 15.4 WEEKS
HCG ADJ MOM SERPL: 1.08
HCG SERPL-ACNC: NORMAL MIU/ML
IDDM PATIENT QL: NO
INHIBIN A ADJ MOM SERPL: 0.65
INHIBIN A SERPL-MCNC: 92.62 PG/ML
LABORATORY COMMENT REPORT: NORMAL
MULTIPLE PREGNANCY: NO
NEURAL TUBE DEFECT RISK FETUS: NORMAL %
RESULT: NORMAL
TS 18 RISK FETUS: NORMAL
TS 21 RISK FETUS: 3919
U ESTRIOL ADJ MOM SERPL: 0.82
U ESTRIOL SERPL-MCNC: 0.47 NG/ML

## 2018-08-15 RX ORDER — ONDANSETRON 4 MG/1
TABLET, FILM COATED ORAL
Qty: 30 TABLET | Refills: 0 | Status: SHIPPED | OUTPATIENT
Start: 2018-08-15 | End: 2019-01-02 | Stop reason: HOSPADM

## 2018-08-21 ENCOUNTER — ROUTINE PRENATAL (OUTPATIENT)
Dept: OBSTETRICS AND GYNECOLOGY | Facility: CLINIC | Age: 23
End: 2018-08-21

## 2018-08-21 ENCOUNTER — PROCEDURE VISIT (OUTPATIENT)
Dept: OBSTETRICS AND GYNECOLOGY | Facility: CLINIC | Age: 23
End: 2018-08-21

## 2018-08-21 VITALS — SYSTOLIC BLOOD PRESSURE: 118 MMHG | BODY MASS INDEX: 36.18 KG/M2 | WEIGHT: 231 LBS | DIASTOLIC BLOOD PRESSURE: 70 MMHG

## 2018-08-21 DIAGNOSIS — Z3A.20 20 WEEKS GESTATION OF PREGNANCY: ICD-10-CM

## 2018-08-21 DIAGNOSIS — Z67.91 RH NEGATIVE STATUS DURING PREGNANCY IN SECOND TRIMESTER: ICD-10-CM

## 2018-08-21 DIAGNOSIS — O26.892 RH NEGATIVE STATUS DURING PREGNANCY IN SECOND TRIMESTER: ICD-10-CM

## 2018-08-21 DIAGNOSIS — Z36.89 ENCOUNTER FOR FETAL ANATOMIC SURVEY: Primary | ICD-10-CM

## 2018-08-21 DIAGNOSIS — O21.9 NAUSEA AND VOMITING DURING PREGNANCY PRIOR TO 22 WEEKS GESTATION: ICD-10-CM

## 2018-08-21 PROCEDURE — 76805 OB US >/= 14 WKS SNGL FETUS: CPT | Performed by: OBSTETRICS & GYNECOLOGY

## 2018-08-21 PROCEDURE — 99213 OFFICE O/P EST LOW 20 MIN: CPT | Performed by: OBSTETRICS & GYNECOLOGY

## 2018-08-21 NOTE — PROGRESS NOTES
S:    Pt here for ob office visit and anatomy scan.   history:  HPI:Adamaris Whitehead is a 23 y.o.  20w2d being seen today for her obstetrical visit.   Patient reports no complaints . Fetal movement: normal. .        ROS: Pt denies visual changes, headaches, shortness of breath, chest pain, esophageal reflux, gastric pain, nausea and vomiting, diarrhea, rashes, vaginal bleeding, edema, hip pain, pelvic pressure.     PFSH:   Past Medical History:   Diagnosis Date   • Asthma    • Rosacea    • UTI (urinary tract infection)        SMOKER? no    ALCOHOL? no  ILLICIT DRUGS? no      O:  /70   Wt 105 kg (231 lb)   BMI 36.18 kg/m² , additional findings in addition to above flow sheet: u/s: SUBOPTIMAL VISUALIZATION DUE TO FETAL POSITION AND PATIENT HABITUS.  AORTIC AND DUCTAL ARCHES OF THE FETAL HEART ARE NOT VISIBLE DUE TO FETAL POSITION.  ANATOMIC SURVEY IS NORMAL WITHIN THE LIMITATIONS OF ULTRASOUND, FETAL POSITION,  GESTATIONAL AGE AND PATIENT HABITUS.      A:  MEDICAL DECISION MAKING:   DIAGNOSES:  23 y.o.  20w2d  Patient Active Problem List   Diagnosis   • Pregnant   • Rh negative status during pregnancy   • BMI 40.0-44.9, adult (CMS/HCC)   • Nausea and vomiting during pregnancy prior to 22 weeks gestation   • Acute pyelonephritis   • Rosacea   • Pyelonephritis affecting pregnancy     NEW PROBLEMS? no    Data Review: UA & u/s  ANT? no  Lab Results (last 24 hours)     ** No results found for the last 24 hours. **          Adamaris was seen today for routine prenatal visit.    Diagnoses and all orders for this visit:    BMI 40.0-44.9, adult (CMS/HCC)    Rh negative status during pregnancy in second trimester    20 weeks gestation of pregnancy    Nausea and vomiting during pregnancy prior to 22 weeks gestation      Pregnancy Assessment : Normal Pregnancy       P:  Tests ordered for this or next visit: ULTRASOUND FOR cardiac views  New Meds: no    Return in about 4 weeks (around 2018) for ob  muna.    Logan Denton MD

## 2018-09-18 ENCOUNTER — ROUTINE PRENATAL (OUTPATIENT)
Dept: OBSTETRICS AND GYNECOLOGY | Facility: CLINIC | Age: 23
End: 2018-09-18

## 2018-09-18 ENCOUNTER — PROCEDURE VISIT (OUTPATIENT)
Dept: OBSTETRICS AND GYNECOLOGY | Facility: CLINIC | Age: 23
End: 2018-09-18

## 2018-09-18 VITALS — BODY MASS INDEX: 37.34 KG/M2 | DIASTOLIC BLOOD PRESSURE: 74 MMHG | WEIGHT: 238.4 LBS | SYSTOLIC BLOOD PRESSURE: 116 MMHG

## 2018-09-18 DIAGNOSIS — O23.02 PYELONEPHRITIS AFFECTING PREGNANCY IN SECOND TRIMESTER: ICD-10-CM

## 2018-09-18 DIAGNOSIS — Z67.91 RH NEGATIVE STATUS DURING PREGNANCY IN SECOND TRIMESTER: Primary | ICD-10-CM

## 2018-09-18 DIAGNOSIS — O26.892 RH NEGATIVE STATUS DURING PREGNANCY IN SECOND TRIMESTER: Primary | ICD-10-CM

## 2018-09-18 PROCEDURE — 76816 OB US FOLLOW-UP PER FETUS: CPT | Performed by: OBSTETRICS & GYNECOLOGY

## 2018-09-18 PROCEDURE — 99213 OFFICE O/P EST LOW 20 MIN: CPT | Performed by: OBSTETRICS & GYNECOLOGY

## 2018-09-18 RX ORDER — NITROFURANTOIN 25; 75 MG/1; MG/1
100 CAPSULE ORAL NIGHTLY
Qty: 30 CAPSULE | Refills: 2 | Status: SHIPPED | OUTPATIENT
Start: 2018-09-18 | End: 2018-10-15 | Stop reason: SDUPTHER

## 2018-09-18 NOTE — PROGRESS NOTES
OB follow up     Adamaris Whitehead is a 23 y.o.  24w2d being seen today for her obstetrical visit.  Patient reports no bleeding, no contractions and no leaking. Fetal movement: normal.  Patient is here for repeat fetal heart views.  She complains of right hip pain that occurs intermittently.  It is severe in nature when it happens.  It is occurred in her previous pregnancies but usually later in the pregnancy.  Prenatal care is also been complicated by history of probable nephritis, she is on daily Macrobid prophylaxis.    Review of Systems  No bleeding, No cramping/contractions     /74   Wt 108 kg (238 lb 6.4 oz)   BMI 37.34 kg/m²     FHT: present BPM   Uterine Size: 24 cm   Live viable bennett fetus in the breech position.  Growth is in the 52nd percentile.  Fetal heart views were normal.  Anatomical survey was normal.  LUKASZ is normal.    Assessment/Plan:    1) 23 y.o.  -pregnancy at 24w2d    2)   Encounter Diagnoses   Name Primary?   • Rh negative status during pregnancy in second trimester Yes   • Pyelonephritis affecting pregnancy in second trimester    Continue daily Macrobid prophylaxis.  Follow-up in 4 weeks for 2 hour GTT and RhoGAM injection.    3) Reviewed this stage of pregnancy  4) Problem list updated     Return in about 4 weeks (around 10/16/2018) for 2 HR GTT, OB Tummy.      Robby Dillon MD    2018  2:58 PM

## 2018-10-15 ENCOUNTER — ROUTINE PRENATAL (OUTPATIENT)
Dept: OBSTETRICS AND GYNECOLOGY | Facility: CLINIC | Age: 23
End: 2018-10-15

## 2018-10-15 VITALS — BODY MASS INDEX: 38.37 KG/M2 | WEIGHT: 245 LBS | SYSTOLIC BLOOD PRESSURE: 134 MMHG | DIASTOLIC BLOOD PRESSURE: 80 MMHG

## 2018-10-15 DIAGNOSIS — Z36.9 ENCOUNTER FOR ANTENATAL SCREENING, UNSPECIFIED: Primary | ICD-10-CM

## 2018-10-15 DIAGNOSIS — Z23 NEEDS FLU SHOT: ICD-10-CM

## 2018-10-15 DIAGNOSIS — Z23 NEED FOR TDAP VACCINATION: ICD-10-CM

## 2018-10-15 DIAGNOSIS — Z34.90 PREGNANCY, UNSPECIFIED GESTATIONAL AGE: ICD-10-CM

## 2018-10-15 DIAGNOSIS — O23.00 PYELONEPHRITIS AFFECTING PREGNANCY, ANTEPARTUM: ICD-10-CM

## 2018-10-15 DIAGNOSIS — O26.899 RH NEGATIVE, ANTEPARTUM: ICD-10-CM

## 2018-10-15 DIAGNOSIS — Z67.91 RH NEGATIVE, ANTEPARTUM: ICD-10-CM

## 2018-10-15 PROBLEM — IMO0002 INFANT, LARGE: Status: ACTIVE | Noted: 2018-10-15

## 2018-10-15 PROBLEM — N10 ACUTE PYELONEPHRITIS: Status: RESOLVED | Noted: 2018-07-12 | Resolved: 2018-10-15

## 2018-10-15 PROBLEM — R82.5 POSITIVE URINE DRUG SCREEN: Status: ACTIVE | Noted: 2018-10-15

## 2018-10-15 PROCEDURE — 96372 THER/PROPH/DIAG INJ SC/IM: CPT | Performed by: OBSTETRICS & GYNECOLOGY

## 2018-10-15 PROCEDURE — 99213 OFFICE O/P EST LOW 20 MIN: CPT | Performed by: OBSTETRICS & GYNECOLOGY

## 2018-10-15 PROCEDURE — 90472 IMMUNIZATION ADMIN EACH ADD: CPT | Performed by: OBSTETRICS & GYNECOLOGY

## 2018-10-15 PROCEDURE — 90686 IIV4 VACC NO PRSV 0.5 ML IM: CPT | Performed by: OBSTETRICS & GYNECOLOGY

## 2018-10-15 PROCEDURE — 90471 IMMUNIZATION ADMIN: CPT | Performed by: OBSTETRICS & GYNECOLOGY

## 2018-10-15 PROCEDURE — 90715 TDAP VACCINE 7 YRS/> IM: CPT | Performed by: OBSTETRICS & GYNECOLOGY

## 2018-10-15 RX ORDER — NITROFURANTOIN 25; 75 MG/1; MG/1
100 CAPSULE ORAL NIGHTLY
Qty: 30 CAPSULE | Refills: 2 | Status: SHIPPED | OUTPATIENT
Start: 2018-10-15 | End: 2019-01-02 | Stop reason: HOSPADM

## 2018-10-15 NOTE — PROGRESS NOTES
OB follow up     Adamaris Whitehead is a 23 y.o.  28w1d being seen today for her obstetrical visit.  Patient reports B hip pain. Fetal movement: normal.    Review of Systems  No bleeding, No cramping/contractions     /80   Wt 111 kg (245 lb)   BMI 38.37 kg/m²     FHT:   145 BPM   Uterine Size:   27 cms       Assessment/Plan:    1) 23 y.o.  -pregnancy at 28w1d- hip pain, enc pregnancy belt. 2 hour GTT today    2) Rh neg- Rhogam today.    3) Tdap and flu shot today. Enc household contacts to be vaccinated.     4) H/O 8.12 infant- check US growth 36-37 weeks.    5) H/O pyelo- pt on Macrobid suppression nightly.     6) Plans Nexplanon pp    7)Reviewed this stage of pregnancy    8)Problem list updated     9) RTO 2 weeks OBT      Cande Guzman MD    10/15/2018  9:45 AM

## 2018-10-16 PROBLEM — O99.019 ANEMIA AFFECTING PREGNANCY: Status: ACTIVE | Noted: 2018-10-16

## 2018-10-16 LAB
ABO GROUP BLD: NORMAL
BLD GP AB SCN SERPL QL: NEGATIVE
GLUCOSE 1H P 75 G GLC PO SERPL-MCNC: 137 MG/DL (ref 40–300)
GLUCOSE 2H P 75 G GLC PO SERPL-MCNC: 107 MG/DL (ref 40–300)
GLUCOSE P FAST SERPL-MCNC: 81 MG/DL (ref 65–99)
HCT VFR BLD AUTO: 34.3 % (ref 37–47)
HGB BLD-MCNC: 11.2 G/DL (ref 12–16)
RH BLD: NEGATIVE

## 2018-10-16 RX ORDER — FERROUS SULFATE 325(65) MG
325 TABLET ORAL
Qty: 30 TABLET | Refills: 6 | Status: SHIPPED | OUTPATIENT
Start: 2018-10-16 | End: 2020-09-30

## 2018-10-29 ENCOUNTER — ROUTINE PRENATAL (OUTPATIENT)
Dept: OBSTETRICS AND GYNECOLOGY | Facility: CLINIC | Age: 23
End: 2018-10-29

## 2018-10-29 VITALS — DIASTOLIC BLOOD PRESSURE: 70 MMHG | WEIGHT: 243 LBS | SYSTOLIC BLOOD PRESSURE: 124 MMHG | BODY MASS INDEX: 38.06 KG/M2

## 2018-10-29 DIAGNOSIS — O23.03 PYELONEPHRITIS AFFECTING PREGNANCY IN THIRD TRIMESTER: ICD-10-CM

## 2018-10-29 DIAGNOSIS — O26.893 RH NEGATIVE STATUS DURING PREGNANCY IN THIRD TRIMESTER: ICD-10-CM

## 2018-10-29 DIAGNOSIS — R82.5 POSITIVE URINE DRUG SCREEN: ICD-10-CM

## 2018-10-29 DIAGNOSIS — Z34.93 PRENATAL CARE IN THIRD TRIMESTER: Primary | ICD-10-CM

## 2018-10-29 DIAGNOSIS — Z67.91 RH NEGATIVE STATUS DURING PREGNANCY IN THIRD TRIMESTER: ICD-10-CM

## 2018-10-29 PROCEDURE — 99213 OFFICE O/P EST LOW 20 MIN: CPT | Performed by: NURSE PRACTITIONER

## 2018-10-29 RX ORDER — DOXYLAMINE SUCCINATE 25 MG/1
TABLET ORAL
COMMUNITY
Start: 2018-08-09 | End: 2019-01-02 | Stop reason: HOSPADM

## 2018-10-29 RX ORDER — DIPHENHYDRAMINE HYDROCHLORIDE 25 MG/1
CAPSULE ORAL
COMMUNITY
Start: 2018-08-09 | End: 2019-01-02 | Stop reason: HOSPADM

## 2018-10-31 ENCOUNTER — PROCEDURE VISIT (OUTPATIENT)
Dept: OBSTETRICS AND GYNECOLOGY | Facility: CLINIC | Age: 23
End: 2018-10-31

## 2018-10-31 DIAGNOSIS — O47.03 PRETERM UTERINE CONTRACTIONS IN THIRD TRIMESTER, ANTEPARTUM: ICD-10-CM

## 2018-10-31 DIAGNOSIS — O26.843 UTERINE SIZE DATE DISCREPANCY PREGNANCY, THIRD TRIMESTER: Primary | ICD-10-CM

## 2018-11-07 PROBLEM — Z34.93 PRENATAL CARE IN THIRD TRIMESTER: Status: ACTIVE | Noted: 2018-11-07

## 2018-11-12 ENCOUNTER — ROUTINE PRENATAL (OUTPATIENT)
Dept: OBSTETRICS AND GYNECOLOGY | Facility: CLINIC | Age: 23
End: 2018-11-12

## 2018-11-12 VITALS — SYSTOLIC BLOOD PRESSURE: 130 MMHG | WEIGHT: 245 LBS | BODY MASS INDEX: 38.37 KG/M2 | DIASTOLIC BLOOD PRESSURE: 78 MMHG

## 2018-11-12 DIAGNOSIS — Z67.91 RH NEGATIVE STATUS DURING PREGNANCY IN THIRD TRIMESTER: ICD-10-CM

## 2018-11-12 DIAGNOSIS — O26.893 RH NEGATIVE STATUS DURING PREGNANCY IN THIRD TRIMESTER: ICD-10-CM

## 2018-11-12 DIAGNOSIS — O23.03 PYELONEPHRITIS AFFECTING PREGNANCY IN THIRD TRIMESTER: ICD-10-CM

## 2018-11-12 DIAGNOSIS — Z34.93 PRENATAL CARE IN THIRD TRIMESTER: Primary | ICD-10-CM

## 2018-11-12 PROCEDURE — 99213 OFFICE O/P EST LOW 20 MIN: CPT | Performed by: NURSE PRACTITIONER

## 2018-11-12 NOTE — PROGRESS NOTES
OB follow up > 20 weeks    Chief Complaint   Patient presents with   • Routine Prenatal Visit       Adamaris Whitehead is a 23 y.o.  32w1d being seen today for her obstetrical visit.  Patient reports pelvic pressure and hip pain. She reports she her legs go numb when she sits for along period of time. . Fetal movement: normal. +PNV.      Review of Systems  No bleeding. No cramping/contractions. No leaking of fluid. Good fetal movement.       /78   Wt 111 kg (245 lb)   BMI 38.37 kg/m²     FHT: 130s  BPM   Uterine Size: size equals dates       Assessment    1) pregnancy at 32w1d     2) Rh neg- S/P rhogam.     3) S/P tdap and flu vaccine      4) H/O 8.12 infant- Growth 63% last week.      5) H/O pyelo- is not taking for Macrobid suppression nightly, she can not find her rx. ERX sent to [facility]. Check urine culture.      6) Occas contractions- Rev warn s/s. CL 3.91cm.       7) Increased BMI- TWG= 4 pounds.     8) Hip and pelvic pain- CL 3.91 cm. Rec pregnancy support belt. Offer PT and chiro- pt declines  Plan    Continue prenatal vitamins  Reviewed this stage of pregnancy  Problem list updated   Follow up in 2 weeks    Beena Haddad, BLAINE  2018  3:59 PM

## 2018-11-14 LAB
BACTERIA UR CULT: NORMAL
BACTERIA UR CULT: NORMAL

## 2018-11-27 ENCOUNTER — ROUTINE PRENATAL (OUTPATIENT)
Dept: OBSTETRICS AND GYNECOLOGY | Facility: CLINIC | Age: 23
End: 2018-11-27

## 2018-11-27 VITALS — DIASTOLIC BLOOD PRESSURE: 66 MMHG | SYSTOLIC BLOOD PRESSURE: 98 MMHG | BODY MASS INDEX: 38.37 KG/M2 | WEIGHT: 245 LBS

## 2018-11-27 DIAGNOSIS — R25.2 LEG CRAMPS: ICD-10-CM

## 2018-11-27 DIAGNOSIS — T14.8XXA BRUISING: Primary | ICD-10-CM

## 2018-11-27 DIAGNOSIS — Z67.91 RH NEGATIVE STATUS DURING PREGNANCY IN THIRD TRIMESTER: ICD-10-CM

## 2018-11-27 DIAGNOSIS — Z87.898 H/O NAUSEA AND VOMITING: ICD-10-CM

## 2018-11-27 DIAGNOSIS — Z34.93 PRENATAL CARE IN THIRD TRIMESTER: ICD-10-CM

## 2018-11-27 DIAGNOSIS — O26.893 RH NEGATIVE STATUS DURING PREGNANCY IN THIRD TRIMESTER: ICD-10-CM

## 2018-11-27 LAB
ALBUMIN SERPL-MCNC: 3.7 G/DL (ref 3.5–5.2)
ALBUMIN/GLOB SERPL: 1.4 G/DL
ALP SERPL-CCNC: 174 U/L (ref 40–129)
ALT SERPL-CCNC: <5 U/L (ref 5–33)
AST SERPL-CCNC: 8 U/L (ref 5–32)
BASOPHILS # BLD AUTO: 0.03 10*3/MM3 (ref 0–0.2)
BASOPHILS NFR BLD AUTO: 0.2 % (ref 0–2)
BILIRUB SERPL-MCNC: 0.3 MG/DL (ref 0.2–1.2)
BUN SERPL-MCNC: 5 MG/DL (ref 6–20)
BUN/CREAT SERPL: 7 (ref 7–25)
CALCIUM SERPL-MCNC: 9.1 MG/DL (ref 8.6–10.5)
CHLORIDE SERPL-SCNC: 103 MMOL/L (ref 98–107)
CO2 SERPL-SCNC: 24.4 MMOL/L (ref 22–29)
CREAT SERPL-MCNC: 0.71 MG/DL (ref 0.57–1)
EOSINOPHIL # BLD AUTO: 0.07 10*3/MM3 (ref 0.1–0.3)
EOSINOPHIL NFR BLD AUTO: 0.5 % (ref 0–4)
ERYTHROCYTE [DISTWIDTH] IN BLOOD BY AUTOMATED COUNT: 14.2 % (ref 11.5–14.5)
GLOBULIN SER CALC-MCNC: 2.6 GM/DL
GLUCOSE SERPL-MCNC: 100 MG/DL (ref 65–99)
HCT VFR BLD AUTO: 36.4 % (ref 37–47)
HGB BLD-MCNC: 12.1 G/DL (ref 12–16)
IMM GRANULOCYTES # BLD: 0.07 10*3/MM3 (ref 0–0.03)
IMM GRANULOCYTES NFR BLD: 0.5 % (ref 0–0.5)
LYMPHOCYTES # BLD AUTO: 2.32 10*3/MM3 (ref 0.6–4.8)
LYMPHOCYTES NFR BLD AUTO: 17.5 % (ref 20–45)
MCH RBC QN AUTO: 29.5 PG (ref 27–31)
MCHC RBC AUTO-ENTMCNC: 33.2 G/DL (ref 31–37)
MCV RBC AUTO: 88.8 FL (ref 81–99)
MONOCYTES # BLD AUTO: 0.52 10*3/MM3 (ref 0–1)
MONOCYTES NFR BLD AUTO: 3.9 % (ref 3–8)
NEUTROPHILS # BLD AUTO: 10.22 10*3/MM3 (ref 1.5–8.3)
NEUTROPHILS NFR BLD AUTO: 77.4 % (ref 45–70)
NRBC BLD AUTO-RTO: 0 /100 WBC (ref 0–0)
PLATELET # BLD AUTO: 181 10*3/MM3 (ref 140–500)
POTASSIUM SERPL-SCNC: 3.9 MMOL/L (ref 3.5–5.2)
PROT SERPL-MCNC: 6.3 G/DL (ref 6–8.5)
RBC # BLD AUTO: 4.1 10*6/MM3 (ref 4.2–5.4)
SODIUM SERPL-SCNC: 138 MMOL/L (ref 136–145)
WBC # BLD AUTO: 13.23 10*3/MM3 (ref 4.8–10.8)

## 2018-11-27 PROCEDURE — 99214 OFFICE O/P EST MOD 30 MIN: CPT | Performed by: NURSE PRACTITIONER

## 2018-12-11 ENCOUNTER — PROCEDURE VISIT (OUTPATIENT)
Dept: OBSTETRICS AND GYNECOLOGY | Facility: CLINIC | Age: 23
End: 2018-12-11

## 2018-12-11 ENCOUNTER — ROUTINE PRENATAL (OUTPATIENT)
Dept: OBSTETRICS AND GYNECOLOGY | Facility: CLINIC | Age: 23
End: 2018-12-11

## 2018-12-11 VITALS — BODY MASS INDEX: 38.84 KG/M2 | WEIGHT: 248 LBS | SYSTOLIC BLOOD PRESSURE: 122 MMHG | DIASTOLIC BLOOD PRESSURE: 70 MMHG

## 2018-12-11 DIAGNOSIS — L71.9 ROSACEA: ICD-10-CM

## 2018-12-11 DIAGNOSIS — O99.013 ANEMIA AFFECTING PREGNANCY IN THIRD TRIMESTER: ICD-10-CM

## 2018-12-11 DIAGNOSIS — Z34.93 PRENATAL CARE IN THIRD TRIMESTER: Primary | ICD-10-CM

## 2018-12-11 DIAGNOSIS — Z36.9 ENCOUNTER FOR ANTENATAL SCREENING, UNSPECIFIED: ICD-10-CM

## 2018-12-11 DIAGNOSIS — O26.843 UTERINE SIZE-DATE DISCREPANCY IN THIRD TRIMESTER: Primary | ICD-10-CM

## 2018-12-11 PROCEDURE — 76816 OB US FOLLOW-UP PER FETUS: CPT | Performed by: OBSTETRICS & GYNECOLOGY

## 2018-12-11 PROCEDURE — 99213 OFFICE O/P EST LOW 20 MIN: CPT | Performed by: OBSTETRICS & GYNECOLOGY

## 2018-12-11 NOTE — PROGRESS NOTES
OB follow up     Adamaris Whitehead is a 23 y.o.  36w2d being seen today for her obstetrical visit.  Patient reports no bleeding, no contractions and no leaking. Fetal movement: normal.  Anemia stable on iron.  Needs GBS today.  Rosacea is worsening due to pregnancy.    Review of Systems  No bleeding, No cramping/contractions     /70   Wt 112 kg (248 lb)   BMI 38.84 kg/m²     FHT: present BPM   Uterine Size: 36 cm   GBS collected.    Assessment/Plan:    1) 23 y.o.  -pregnancy at 36w2d    2)   Encounter Diagnoses   Name Primary?   • Prenatal care in third trimester Yes   • Anemia affecting pregnancy in third trimester    • Rosacea    • Encounter for  screening, unspecified        3) Reviewed this stage of pregnancy  4) Problem list updated     Return in about 7 days (around 2018) for OB INT.      Robby Dillon MD    2018  4:47 PM

## 2018-12-15 LAB — B-HEM STREP SPEC QL CULT: POSITIVE

## 2018-12-20 ENCOUNTER — ROUTINE PRENATAL (OUTPATIENT)
Dept: OBSTETRICS AND GYNECOLOGY | Facility: CLINIC | Age: 23
End: 2018-12-20

## 2018-12-20 VITALS — WEIGHT: 247 LBS | BODY MASS INDEX: 38.69 KG/M2 | SYSTOLIC BLOOD PRESSURE: 120 MMHG | DIASTOLIC BLOOD PRESSURE: 72 MMHG

## 2018-12-20 DIAGNOSIS — Z67.91 RH NEGATIVE STATUS DURING PREGNANCY IN THIRD TRIMESTER: ICD-10-CM

## 2018-12-20 DIAGNOSIS — O26.893 RH NEGATIVE STATUS DURING PREGNANCY IN THIRD TRIMESTER: ICD-10-CM

## 2018-12-20 DIAGNOSIS — O36.8191 DECREASED FETAL MOVEMENT AFFECTING MANAGEMENT OF PREGNANCY, ANTEPARTUM, FETUS 1 OF MULTIPLE GESTATION: ICD-10-CM

## 2018-12-20 DIAGNOSIS — Z34.93 PRENATAL CARE IN THIRD TRIMESTER: Primary | ICD-10-CM

## 2018-12-20 DIAGNOSIS — Z22.330 GBS CARRIER: ICD-10-CM

## 2018-12-20 PROBLEM — O36.8190 DECREASED FETAL MOVEMENTS AFFECTING MANAGEMENT OF MOTHER, ANTEPARTUM: Status: ACTIVE | Noted: 2018-12-20

## 2018-12-20 PROCEDURE — 59025 FETAL NON-STRESS TEST: CPT | Performed by: NURSE PRACTITIONER

## 2018-12-20 PROCEDURE — 99214 OFFICE O/P EST MOD 30 MIN: CPT | Performed by: NURSE PRACTITIONER

## 2018-12-20 NOTE — PROGRESS NOTES
OB follow up > 20 weeks    Chief Complaint   Patient presents with   • Routine Prenatal Visit       Adamaris Whitehead is a 23 y.o.  37w4d being seen today for her obstetrical visit.  Patient reports uterine cramping. States she feels miserable. Fetal movement: decreased. +PNV.      Review of Systems  No bleeding. No cramping/contractions. No leaking of fluid. Good fetal movement.       /72   Wt 112 kg (247 lb)   BMI 38.69 kg/m²     FHT: 140s  BPM   Uterine Size: size equals dates       Assessment    1) pregnancy at 37w4d with decreased fetal movement- NST Reactive. Rev kick counts. Pt has been out Rad shopping and has not eaten all day     2) Rh neg- S/P rhogam.     3) S/P tdap and flu vaccine      4) H/O 8.12 infant- Growth 68% last week.      5) H/O pyelo- Urine culture is neg.      6) Occas contractions- Rev warn s/s. CL 3.91cm.       7) Increased BMI- TWG= 6 pounds.       8) Hip and pelvic pain- Rec pregnancy support belt. Offer PT and chiro- pt declines     9) Nausea and vomiting- Taking Vit B6 and unisom    10) GBS+, understands will need antibx during labor      Plan    Continue prenatal vitamins  Reviewed this stage of pregnancy  Problem list updated   Follow up in 1 weeks    BLAINE Diane  2018  5:01 PM

## 2018-12-27 ENCOUNTER — ROUTINE PRENATAL (OUTPATIENT)
Dept: OBSTETRICS AND GYNECOLOGY | Facility: CLINIC | Age: 23
End: 2018-12-27

## 2018-12-27 VITALS — WEIGHT: 249 LBS | SYSTOLIC BLOOD PRESSURE: 112 MMHG | BODY MASS INDEX: 39 KG/M2 | DIASTOLIC BLOOD PRESSURE: 78 MMHG

## 2018-12-27 DIAGNOSIS — Z22.330 GBS CARRIER: Primary | ICD-10-CM

## 2018-12-27 DIAGNOSIS — O26.893 RH NEGATIVE STATUS DURING PREGNANCY IN THIRD TRIMESTER: ICD-10-CM

## 2018-12-27 DIAGNOSIS — Z67.91 RH NEGATIVE STATUS DURING PREGNANCY IN THIRD TRIMESTER: ICD-10-CM

## 2018-12-27 DIAGNOSIS — Z34.93 PRENATAL CARE IN THIRD TRIMESTER: ICD-10-CM

## 2018-12-27 PROCEDURE — 99213 OFFICE O/P EST LOW 20 MIN: CPT | Performed by: NURSE PRACTITIONER

## 2018-12-27 NOTE — PROGRESS NOTES
"OB follow up > 20 weeks    Chief Complaint   Patient presents with   • Routine Prenatal Visit       Adamaris Whitehead is a 23 y.o.  38w4d being seen today for her obstetrical visit.  Patient reports feeling miserable. She contracts \"alot\" and has \"terrible\" pelvic pain. States it is difficult for her to get out of bed and walk at times. She is requesting an IOL. She lives over 1 hour away and is worried about getting to the hospital in time for delivery. . Fetal movement: normal. +PNV.      Review of Systems  No bleeding. No cramping/contractions. No leaking of fluid. Good fetal movement.       /78   Wt 113 kg (249 lb)   BMI 39.00 kg/m²     FHT: 140s  BPM   Uterine Size: size equals dates       Assessment    1) pregnancy at 38w4d- Consider IOL @ 39 weeks for multiple factors including pt's comfort level,  4, logistics, and  favorable cervix     2) GBS +, understands she will need antibx during labor     3) Rh neg- S/P rhogam.     4) S/P tdap and flu vaccine     5) H/O 8.12 infant- Growth 68% last week.      6) H/O pyelo- Urine culture is neg.      7) Increased BMI- TWG= 6 pounds.       8) Hip and pelvic pain- Rec pregnancy support belt. Offer PT and chiro- pt declines     9) Nausea and vomiting- Taking Vit B6 and unisom     10) GBS+, understands will need antibx during labor   Plan    Continue prenatal vitamins  Reviewed this stage of pregnancy  Problem list updated       Beena Haddad, BLAINE  2018  4:50 PM  "

## 2018-12-31 ENCOUNTER — HOSPITAL ENCOUNTER (INPATIENT)
Facility: HOSPITAL | Age: 23
LOS: 2 days | Discharge: HOME | End: 2019-01-02
Attending: OBSTETRICS & GYNECOLOGY | Admitting: OBSTETRICS & GYNECOLOGY

## 2018-12-31 ENCOUNTER — ANESTHESIA EVENT (OUTPATIENT)
Dept: OBSTETRICS AND GYNECOLOGY | Facility: HOSPITAL | Age: 23
End: 2018-12-31

## 2018-12-31 ENCOUNTER — ANESTHESIA (OUTPATIENT)
Dept: OBSTETRICS AND GYNECOLOGY | Facility: HOSPITAL | Age: 23
End: 2018-12-31

## 2018-12-31 PROBLEM — Z37.9 NORMAL LABOR: Status: ACTIVE | Noted: 2018-12-31

## 2018-12-31 LAB
ABO GROUP BLD: NORMAL
AMPHET+METHAMPHET UR QL: NEGATIVE
AMPHETAMINES UR QL: NEGATIVE
BARBITURATES UR QL SCN: NEGATIVE
BENZODIAZ UR QL SCN: NEGATIVE
BLD GP AB SCN SERPL QL: NEGATIVE
BUPRENORPHINE SERPL-MCNC: NEGATIVE NG/ML
CANNABINOIDS SERPL QL: NEGATIVE
COCAINE UR QL: NEGATIVE
DEPRECATED RDW RBC AUTO: 45.5 FL (ref 37–54)
ERYTHROCYTE [DISTWIDTH] IN BLOOD BY AUTOMATED COUNT: 14.6 % (ref 11.5–14.5)
HCT VFR BLD AUTO: 35.5 % (ref 37–47)
HGB BLD-MCNC: 11.7 G/DL (ref 12–16)
MCH RBC QN AUTO: 28.5 PG (ref 27–31)
MCHC RBC AUTO-ENTMCNC: 33 G/DL (ref 31–37)
MCV RBC AUTO: 86.6 FL (ref 81–99)
METHADONE UR QL SCN: NEGATIVE
OPIATES UR QL: NEGATIVE
OXYCODONE UR QL SCN: NEGATIVE
PCP UR QL SCN: NEGATIVE
PLATELET # BLD AUTO: 172 10*3/MM3 (ref 140–500)
PMV BLD AUTO: 13.7 FL (ref 7.4–10.4)
PROPOXYPH UR QL: NEGATIVE
RBC # BLD AUTO: 4.1 10*6/MM3 (ref 4.2–5.4)
RH BLD: NEGATIVE
T&S EXPIRATION DATE: NORMAL
TRICYCLICS UR QL SCN: NEGATIVE
WBC NRBC COR # BLD: 11.83 10*3/MM3 (ref 4.8–10.8)

## 2018-12-31 PROCEDURE — 3E033VJ INTRODUCTION OF OTHER HORMONE INTO PERIPHERAL VEIN, PERCUTANEOUS APPROACH: ICD-10-PCS | Performed by: OBSTETRICS & GYNECOLOGY

## 2018-12-31 PROCEDURE — 59410 OBSTETRICAL CARE: CPT | Performed by: OBSTETRICS & GYNECOLOGY

## 2018-12-31 PROCEDURE — 86850 RBC ANTIBODY SCREEN: CPT | Performed by: OBSTETRICS & GYNECOLOGY

## 2018-12-31 PROCEDURE — S0260 H&P FOR SURGERY: HCPCS | Performed by: OBSTETRICS & GYNECOLOGY

## 2018-12-31 PROCEDURE — 86901 BLOOD TYPING SEROLOGIC RH(D): CPT | Performed by: OBSTETRICS & GYNECOLOGY

## 2018-12-31 PROCEDURE — 25010000002 ONDANSETRON PER 1 MG: Performed by: OBSTETRICS & GYNECOLOGY

## 2018-12-31 PROCEDURE — 86900 BLOOD TYPING SEROLOGIC ABO: CPT | Performed by: OBSTETRICS & GYNECOLOGY

## 2018-12-31 PROCEDURE — 85027 COMPLETE CBC AUTOMATED: CPT | Performed by: OBSTETRICS & GYNECOLOGY

## 2018-12-31 PROCEDURE — C1755 CATHETER, INTRASPINAL: HCPCS | Performed by: NURSE ANESTHETIST, CERTIFIED REGISTERED

## 2018-12-31 PROCEDURE — 25010000002 PENICILLIN G POTASSIUM PER 600000 UNITS

## 2018-12-31 PROCEDURE — 80306 DRUG TEST PRSMV INSTRMNT: CPT | Performed by: OBSTETRICS & GYNECOLOGY

## 2018-12-31 PROCEDURE — 25010000002 PENICILLIN G POTASSIUM PER 600000 UNITS: Performed by: OBSTETRICS & GYNECOLOGY

## 2018-12-31 RX ORDER — METHYLERGONOVINE MALEATE 0.2 MG/ML
200 INJECTION INTRAVENOUS ONCE AS NEEDED
Status: CANCELLED | OUTPATIENT
Start: 2018-12-31

## 2018-12-31 RX ORDER — PROMETHAZINE HYDROCHLORIDE 25 MG/1
12.5 SUPPOSITORY RECTAL EVERY 6 HOURS PRN
Status: DISCONTINUED | OUTPATIENT
Start: 2018-12-31 | End: 2019-01-02 | Stop reason: HOSPADM

## 2018-12-31 RX ORDER — OXYTOCIN/0.9 % SODIUM CHLORIDE 30/500 ML
2-20 PLASTIC BAG, INJECTION (ML) INTRAVENOUS
Status: DISCONTINUED | OUTPATIENT
Start: 2018-12-31 | End: 2018-12-31

## 2018-12-31 RX ORDER — BISACODYL 10 MG
10 SUPPOSITORY, RECTAL RECTAL DAILY PRN
Status: DISCONTINUED | OUTPATIENT
Start: 2019-01-01 | End: 2019-01-02 | Stop reason: HOSPADM

## 2018-12-31 RX ORDER — FAMOTIDINE 10 MG/ML
20 INJECTION, SOLUTION INTRAVENOUS 2 TIMES DAILY PRN
Status: DISCONTINUED | OUTPATIENT
Start: 2018-12-31 | End: 2018-12-31

## 2018-12-31 RX ORDER — HYDROCODONE BITARTRATE AND ACETAMINOPHEN 5; 325 MG/1; MG/1
2 TABLET ORAL EVERY 4 HOURS PRN
Status: DISCONTINUED | OUTPATIENT
Start: 2018-12-31 | End: 2019-01-02 | Stop reason: HOSPADM

## 2018-12-31 RX ORDER — SODIUM CHLORIDE 9 MG/ML
INJECTION, SOLUTION INTRAVENOUS
Status: DISCONTINUED
Start: 2018-12-31 | End: 2018-12-31 | Stop reason: WASHOUT

## 2018-12-31 RX ORDER — OXYTOCIN/0.9 % SODIUM CHLORIDE 30/500 ML
85 PLASTIC BAG, INJECTION (ML) INTRAVENOUS ONCE
Status: COMPLETED | OUTPATIENT
Start: 2018-12-31 | End: 2018-12-31

## 2018-12-31 RX ORDER — SODIUM CHLORIDE, SODIUM LACTATE, POTASSIUM CHLORIDE, CALCIUM CHLORIDE 600; 310; 30; 20 MG/100ML; MG/100ML; MG/100ML; MG/100ML
125 INJECTION, SOLUTION INTRAVENOUS CONTINUOUS
Status: CANCELLED | OUTPATIENT
Start: 2018-12-31

## 2018-12-31 RX ORDER — SODIUM CHLORIDE 0.9 % (FLUSH) 0.9 %
1-10 SYRINGE (ML) INJECTION AS NEEDED
Status: DISCONTINUED | OUTPATIENT
Start: 2018-12-31 | End: 2019-01-02 | Stop reason: HOSPADM

## 2018-12-31 RX ORDER — IBUPROFEN 800 MG/1
800 TABLET ORAL 3 TIMES DAILY
Status: DISCONTINUED | OUTPATIENT
Start: 2018-12-31 | End: 2019-01-02 | Stop reason: HOSPADM

## 2018-12-31 RX ORDER — DOCUSATE SODIUM 100 MG/1
100 CAPSULE, LIQUID FILLED ORAL 2 TIMES DAILY
Status: DISCONTINUED | OUTPATIENT
Start: 2018-12-31 | End: 2019-01-02 | Stop reason: HOSPADM

## 2018-12-31 RX ORDER — SODIUM CHLORIDE, SODIUM LACTATE, POTASSIUM CHLORIDE, CALCIUM CHLORIDE 600; 310; 30; 20 MG/100ML; MG/100ML; MG/100ML; MG/100ML
125 INJECTION, SOLUTION INTRAVENOUS CONTINUOUS
Status: DISCONTINUED | OUTPATIENT
Start: 2018-12-31 | End: 2018-12-31

## 2018-12-31 RX ORDER — SODIUM CHLORIDE, SODIUM LACTATE, POTASSIUM CHLORIDE, CALCIUM CHLORIDE 600; 310; 30; 20 MG/100ML; MG/100ML; MG/100ML; MG/100ML
125 INJECTION, SOLUTION INTRAVENOUS CONTINUOUS
Status: DISCONTINUED | OUTPATIENT
Start: 2018-12-31 | End: 2019-01-02 | Stop reason: HOSPADM

## 2018-12-31 RX ORDER — ONDANSETRON 2 MG/ML
4 INJECTION INTRAMUSCULAR; INTRAVENOUS EVERY 6 HOURS PRN
Status: DISCONTINUED | OUTPATIENT
Start: 2018-12-31 | End: 2019-01-02 | Stop reason: HOSPADM

## 2018-12-31 RX ORDER — LIDOCAINE HYDROCHLORIDE AND EPINEPHRINE 15; 5 MG/ML; UG/ML
INJECTION, SOLUTION EPIDURAL AS NEEDED
Status: DISCONTINUED | OUTPATIENT
Start: 2018-12-31 | End: 2018-12-31 | Stop reason: SURG

## 2018-12-31 RX ORDER — OXYTOCIN/0.9 % SODIUM CHLORIDE 30/500 ML
PLASTIC BAG, INJECTION (ML) INTRAVENOUS
Status: COMPLETED
Start: 2018-12-31 | End: 2018-12-31

## 2018-12-31 RX ORDER — PRENATAL VIT/IRON FUM/FOLIC AC 27MG-0.8MG
1 TABLET ORAL DAILY
Status: DISCONTINUED | OUTPATIENT
Start: 2018-12-31 | End: 2019-01-02 | Stop reason: HOSPADM

## 2018-12-31 RX ORDER — ONDANSETRON 4 MG/1
4 TABLET, FILM COATED ORAL EVERY 6 HOURS PRN
Status: DISCONTINUED | OUTPATIENT
Start: 2018-12-31 | End: 2019-01-02 | Stop reason: HOSPADM

## 2018-12-31 RX ORDER — PENICILLIN G POTASSIUM 5000000 [IU]/1
INJECTION, POWDER, FOR SOLUTION INTRAMUSCULAR; INTRAVENOUS
Status: COMPLETED
Start: 2018-12-31 | End: 2018-12-31

## 2018-12-31 RX ORDER — OXYTOCIN/0.9 % SODIUM CHLORIDE 30/500 ML
PLASTIC BAG, INJECTION (ML) INTRAVENOUS
Status: DISPENSED
Start: 2018-12-31 | End: 2019-01-01

## 2018-12-31 RX ORDER — LANOLIN 100 %
1 OINTMENT (GRAM) TOPICAL
Status: DISCONTINUED | OUTPATIENT
Start: 2018-12-31 | End: 2019-01-02 | Stop reason: HOSPADM

## 2018-12-31 RX ORDER — ONDANSETRON 2 MG/ML
4 INJECTION INTRAMUSCULAR; INTRAVENOUS EVERY 6 HOURS PRN
Status: DISCONTINUED | OUTPATIENT
Start: 2018-12-31 | End: 2018-12-31

## 2018-12-31 RX ORDER — CARBOPROST TROMETHAMINE 250 UG/ML
250 INJECTION, SOLUTION INTRAMUSCULAR AS NEEDED
Status: CANCELLED | OUTPATIENT
Start: 2018-12-31

## 2018-12-31 RX ORDER — PENICILLIN G 3000000 [IU]/50ML
3 INJECTION, SOLUTION INTRAVENOUS EVERY 4 HOURS
Status: DISCONTINUED | OUTPATIENT
Start: 2018-12-31 | End: 2018-12-31

## 2018-12-31 RX ORDER — SODIUM CHLORIDE 0.9 % (FLUSH) 0.9 %
3 SYRINGE (ML) INJECTION EVERY 12 HOURS SCHEDULED
Status: DISCONTINUED | OUTPATIENT
Start: 2018-12-31 | End: 2018-12-31

## 2018-12-31 RX ORDER — SODIUM CHLORIDE 0.9 % (FLUSH) 0.9 %
3-10 SYRINGE (ML) INJECTION AS NEEDED
Status: DISCONTINUED | OUTPATIENT
Start: 2018-12-31 | End: 2018-12-31

## 2018-12-31 RX ORDER — PROMETHAZINE HYDROCHLORIDE 25 MG/1
25 TABLET ORAL EVERY 6 HOURS PRN
Status: DISCONTINUED | OUTPATIENT
Start: 2018-12-31 | End: 2019-01-02 | Stop reason: HOSPADM

## 2018-12-31 RX ORDER — ONDANSETRON 4 MG/1
4 TABLET, ORALLY DISINTEGRATING ORAL EVERY 6 HOURS PRN
Status: DISCONTINUED | OUTPATIENT
Start: 2018-12-31 | End: 2019-01-02 | Stop reason: HOSPADM

## 2018-12-31 RX ORDER — PROMETHAZINE HYDROCHLORIDE 25 MG/ML
12.5 INJECTION, SOLUTION INTRAMUSCULAR; INTRAVENOUS EVERY 6 HOURS PRN
Status: DISCONTINUED | OUTPATIENT
Start: 2018-12-31 | End: 2019-01-02 | Stop reason: HOSPADM

## 2018-12-31 RX ORDER — OXYTOCIN/0.9 % SODIUM CHLORIDE 30/500 ML
650 PLASTIC BAG, INJECTION (ML) INTRAVENOUS ONCE
Status: DISCONTINUED | OUTPATIENT
Start: 2018-12-31 | End: 2019-01-02 | Stop reason: HOSPADM

## 2018-12-31 RX ORDER — MISOPROSTOL 200 UG/1
800 TABLET ORAL AS NEEDED
Status: CANCELLED | OUTPATIENT
Start: 2018-12-31

## 2018-12-31 RX ORDER — SUFENTANIL CITRATE 50 UG/ML
INJECTION EPIDURAL; INTRAVENOUS
Status: DISPENSED
Start: 2018-12-31 | End: 2019-01-01

## 2018-12-31 RX ORDER — LIDOCAINE HYDROCHLORIDE 10 MG/ML
5 INJECTION, SOLUTION EPIDURAL; INFILTRATION; INTRACAUDAL; PERINEURAL AS NEEDED
Status: DISCONTINUED | OUTPATIENT
Start: 2018-12-31 | End: 2018-12-31

## 2018-12-31 RX ADMIN — IBUPROFEN 800 MG: 800 TABLET ORAL at 21:16

## 2018-12-31 RX ADMIN — LIDOCAINE HYDROCHLORIDE,EPINEPHRINE BITARTRATE 2 ML: 15; .005 INJECTION, SOLUTION EPIDURAL; INFILTRATION; INTRACAUDAL; PERINEURAL at 13:22

## 2018-12-31 RX ADMIN — DOCUSATE SODIUM 100 MG: 100 CAPSULE, LIQUID FILLED ORAL at 21:16

## 2018-12-31 RX ADMIN — OXYTOCIN-SODIUM CHLORIDE 0.9% IV SOLN 30 UNIT/500ML 2 MILLI-UNITS/MIN: 30-0.9/5 SOLUTION at 06:01

## 2018-12-31 RX ADMIN — SUFENTANIL CITRATE 3 ML: 50 INJECTION EPIDURAL; INTRAVENOUS at 13:22

## 2018-12-31 RX ADMIN — SODIUM CHLORIDE, POTASSIUM CHLORIDE, SODIUM LACTATE AND CALCIUM CHLORIDE 125 ML/HR: 600; 310; 30; 20 INJECTION, SOLUTION INTRAVENOUS at 05:30

## 2018-12-31 RX ADMIN — PENICILLIN G 3 MILLION UNITS: 3000000 INJECTION, SOLUTION INTRAVENOUS at 13:40

## 2018-12-31 RX ADMIN — SUFENTANIL CITRATE 5 ML: 50 INJECTION EPIDURAL; INTRAVENOUS at 13:27

## 2018-12-31 RX ADMIN — LIDOCAINE HYDROCHLORIDE,EPINEPHRINE BITARTRATE 3 ML: 15; .005 INJECTION, SOLUTION EPIDURAL; INFILTRATION; INTRACAUDAL; PERINEURAL at 13:17

## 2018-12-31 RX ADMIN — OXYTOCIN-SODIUM CHLORIDE 0.9% IV SOLN 30 UNIT/500ML 85 ML/HR: 30-0.9/5 SOLUTION at 17:45

## 2018-12-31 RX ADMIN — WITCH HAZEL 1 PAD: 500 SOLUTION RECTAL; TOPICAL at 21:17

## 2018-12-31 RX ADMIN — PENICILLIN G POTASSIUM 5 MILLION UNITS: 5000000 POWDER, FOR SOLUTION INTRAMUSCULAR; INTRAPLEURAL; INTRATHECAL; INTRAVENOUS at 05:31

## 2018-12-31 RX ADMIN — SODIUM CHLORIDE, POTASSIUM CHLORIDE, SODIUM LACTATE AND CALCIUM CHLORIDE 999 ML/HR: 600; 310; 30; 20 INJECTION, SOLUTION INTRAVENOUS at 12:40

## 2018-12-31 RX ADMIN — OXYTOCIN-SODIUM CHLORIDE 0.9% IV SOLN 30 UNIT/500ML 2 MILLI-UNITS/MIN: 30-0.9/5 SOLUTION at 10:30

## 2018-12-31 RX ADMIN — SUFENTANIL CITRATE 12 ML/HR: 50 INJECTION EPIDURAL; INTRAVENOUS at 13:30

## 2018-12-31 RX ADMIN — PENICILLIN G 3 MILLION UNITS: 3000000 INJECTION, SOLUTION INTRAVENOUS at 09:30

## 2018-12-31 RX ADMIN — ONDANSETRON 4 MG: 2 INJECTION, SOLUTION INTRAMUSCULAR; INTRAVENOUS at 11:10

## 2019-01-01 LAB
BASOPHILS # BLD AUTO: 0.05 10*3/MM3 (ref 0–0.2)
BASOPHILS NFR BLD AUTO: 0.4 % (ref 0–2)
DEPRECATED RDW RBC AUTO: 46.7 FL (ref 37–54)
EOSINOPHIL # BLD AUTO: 0.13 10*3/MM3 (ref 0.1–0.3)
EOSINOPHIL NFR BLD AUTO: 1.1 % (ref 0–4)
ERYTHROCYTE [DISTWIDTH] IN BLOOD BY AUTOMATED COUNT: 14.6 % (ref 11.5–14.5)
HCT VFR BLD AUTO: 34.9 % (ref 37–47)
HGB BLD-MCNC: 11 G/DL (ref 12–16)
IMM GRANULOCYTES # BLD AUTO: 0.05 10*3/MM3 (ref 0–0.03)
IMM GRANULOCYTES NFR BLD AUTO: 0.4 % (ref 0–0.5)
LYMPHOCYTES # BLD AUTO: 3.79 10*3/MM3 (ref 0.6–4.8)
LYMPHOCYTES NFR BLD AUTO: 31.4 % (ref 20–45)
MCH RBC QN AUTO: 27.9 PG (ref 27–31)
MCHC RBC AUTO-ENTMCNC: 31.5 G/DL (ref 31–37)
MCV RBC AUTO: 88.6 FL (ref 81–99)
MONOCYTES # BLD AUTO: 0.67 10*3/MM3 (ref 0–1)
MONOCYTES NFR BLD AUTO: 5.6 % (ref 3–8)
NEUTROPHILS # BLD AUTO: 7.37 10*3/MM3 (ref 1.5–8.3)
NEUTROPHILS NFR BLD AUTO: 61.1 % (ref 45–70)
NRBC BLD AUTO-RTO: 0 /100 WBC (ref 0–0)
PLATELET # BLD AUTO: 149 10*3/MM3 (ref 140–500)
PMV BLD AUTO: 13.8 FL (ref 7.4–10.4)
RBC # BLD AUTO: 3.94 10*6/MM3 (ref 4.2–5.4)
WBC NRBC COR # BLD: 12.06 10*3/MM3 (ref 4.8–10.8)

## 2019-01-01 PROCEDURE — 85025 COMPLETE CBC W/AUTO DIFF WBC: CPT | Performed by: OBSTETRICS & GYNECOLOGY

## 2019-01-01 PROCEDURE — 99024 POSTOP FOLLOW-UP VISIT: CPT | Performed by: OBSTETRICS & GYNECOLOGY

## 2019-01-01 RX ADMIN — DOCUSATE SODIUM 100 MG: 100 CAPSULE, LIQUID FILLED ORAL at 09:24

## 2019-01-01 RX ADMIN — IBUPROFEN 800 MG: 800 TABLET ORAL at 09:23

## 2019-01-01 RX ADMIN — HYDROCODONE BITARTRATE AND ACETAMINOPHEN 2 TABLET: 5; 325 TABLET ORAL at 05:37

## 2019-01-01 RX ADMIN — DOCUSATE SODIUM 100 MG: 100 CAPSULE, LIQUID FILLED ORAL at 23:30

## 2019-01-01 RX ADMIN — HYDROCODONE BITARTRATE AND ACETAMINOPHEN 2 TABLET: 5; 325 TABLET ORAL at 19:40

## 2019-01-01 RX ADMIN — IBUPROFEN 800 MG: 800 TABLET ORAL at 19:40

## 2019-01-01 RX ADMIN — HYDROCODONE BITARTRATE AND ACETAMINOPHEN 2 TABLET: 5; 325 TABLET ORAL at 23:30

## 2019-01-01 NOTE — PROGRESS NOTES
" Ivonne    Progress Note       Patient Name: Adamaris Whitehead  :  1995  MRN:  [id number]          Subjective  Postpartum Day 1:     The patient feels well.  Her pain is well controlled with prescribed pain medications.   She is ambulating well.  Patient describes her bleeding as red.    Breastfeeding: without difficulty.           /68 (BP Location: Left arm, Patient Position: Lying)   Pulse 57   Temp 97.9 °F (36.6 °C) (Oral)   Resp 18   Ht 170.2 cm (67\")   Wt 113 kg (249 lb)   SpO2 100%   Breastfeeding? Unknown   BMI 39.00 kg/m²       Physical Exam:  General:  no acute distresss.  Abdomen: soft, NT, fundus firm and below umbilicus  Extremities: no calf tenderness      Lab results reviewed:  Yes.   Results from last 7 days   Lab Units  19   0527   HEMOGLOBIN g/dL  11.0*               1) PPD#1: Progressing well. Hgb:11    2) Postpartum Care: routine    3) Dispo: home tomorrow          NARAYAN Dillon MD  2019  12:03 PM  "

## 2019-01-01 NOTE — PROGRESS NOTES
Patient: Adamaris Whitehead  * No surgery found *  Anesthesia type: [unfilled]    Patient location: Labor and Delivery  Last vitals:   Vitals:    01/01/19 0925   BP: 114/68   Pulse: 57   Resp: 18   Temp: 97.9 °F (36.6 °C)   SpO2:      Level of consciousness: awake, alert and oriented    Post-anesthesia pain: adequate analgesia  Airway patency: patent  Respiratory: unassisted  Cardiovascular: stable and blood pressure at baseline  Hydration: euvolemic    Anesthetic complications: no

## 2019-01-01 NOTE — PLAN OF CARE
Problem: Patient Care Overview  Goal: Plan of Care Review   01/01/19 0629   Coping/Psychosocial   Plan of Care Reviewed With patient;significant other   Plan of Care Review   Progress improving   OTHER   Outcome Summary VSS, Pain controlled with PRN meds, light bleeding     Goal: Individualization and Mutuality   01/01/19 0629   Mutuality/Individual Preferences   How Would You and/or Your Support Person Like to Participate in Your Care? Frequent education and information regarding treatment plan     Goal: Interprofessional Rounds/Family Conf   01/01/19 0629   Interdisciplinary Rounds/Family Conf   Participants nursing;patient;physician       Problem: Fall Risk (Adult)  Goal: Identify Related Risk Factors and Signs and Symptoms   01/01/19 0629   Fall Risk (Adult)   Related Risk Factors (Fall Risk) environment unfamiliar     Goal: Absence of Fall   01/01/19 0629   Fall Risk (Adult)   Absence of Fall achieves outcome       Problem: Postpartum (Vaginal Delivery) (Adult,Obstetrics,Pediatric)  Goal: Signs and Symptoms of Listed Potential Problems Will be Absent, Minimized or Managed (Postpartum)   01/01/19 0629   Goal/Outcome Evaluation   Problems Assessed (Postpartum Vaginal Delivery) all   Problems Present (Postpartum Vag Deliv) none

## 2019-01-02 ENCOUNTER — TELEPHONE (OUTPATIENT)
Dept: OBSTETRICS AND GYNECOLOGY | Facility: CLINIC | Age: 24
End: 2019-01-02

## 2019-01-02 VITALS
SYSTOLIC BLOOD PRESSURE: 115 MMHG | BODY MASS INDEX: 39.08 KG/M2 | TEMPERATURE: 97.7 F | RESPIRATION RATE: 18 BRPM | OXYGEN SATURATION: 100 % | WEIGHT: 249 LBS | HEIGHT: 67 IN | HEART RATE: 54 BPM | DIASTOLIC BLOOD PRESSURE: 74 MMHG

## 2019-01-02 PROCEDURE — 99024 POSTOP FOLLOW-UP VISIT: CPT | Performed by: NURSE PRACTITIONER

## 2019-01-02 RX ORDER — IBUPROFEN 800 MG/1
800 TABLET ORAL 3 TIMES DAILY
Qty: 30 TABLET | Refills: 0 | Status: SHIPPED | OUTPATIENT
Start: 2019-01-02 | End: 2020-09-30

## 2019-01-02 RX ORDER — HYDROCODONE BITARTRATE AND ACETAMINOPHEN 5; 325 MG/1; MG/1
2 TABLET ORAL EVERY 4 HOURS PRN
Qty: 15 TABLET | Refills: 0 | Status: SHIPPED | OUTPATIENT
Start: 2019-01-02 | End: 2019-01-10

## 2019-01-02 RX ADMIN — WITCH HAZEL 1 PAD: 500 SOLUTION RECTAL; TOPICAL at 10:37

## 2019-01-02 RX ADMIN — PRENATAL VIT W/ FE FUMARATE-FA TAB 27-0.8 MG 1 TABLET: 27-0.8 TAB at 08:53

## 2019-01-02 RX ADMIN — HYDROCODONE BITARTRATE AND ACETAMINOPHEN 2 TABLET: 5; 325 TABLET ORAL at 09:00

## 2019-01-02 RX ADMIN — IBUPROFEN 800 MG: 800 TABLET ORAL at 03:45

## 2019-01-02 RX ADMIN — BENZOCAINE AND LEVOMENTHOL: 200; 5 SPRAY TOPICAL at 10:37

## 2019-01-02 RX ADMIN — DOCUSATE SODIUM 100 MG: 100 CAPSULE, LIQUID FILLED ORAL at 08:53

## 2019-01-02 RX ADMIN — HYDROCODONE BITARTRATE AND ACETAMINOPHEN 2 TABLET: 5; 325 TABLET ORAL at 03:45

## 2019-01-02 NOTE — PLAN OF CARE
Problem: Patient Care Overview  Goal: Plan of Care Review  Outcome: Outcome(s) achieved Date Met: 01/02/19 01/02/19 0853   Coping/Psychosocial   Plan of Care Reviewed With patient       Problem: Fall Risk (Adult)  Goal: Identify Related Risk Factors and Signs and Symptoms  Outcome: Outcome(s) achieved Date Met: 01/02/19    Goal: Absence of Fall  Outcome: Outcome(s) achieved Date Met: 01/02/19      Problem: Postpartum (Vaginal Delivery) (Adult,Obstetrics,Pediatric)  Goal: Signs and Symptoms of Listed Potential Problems Will be Absent, Minimized or Managed (Postpartum)  Outcome: Outcome(s) achieved Date Met: 01/02/19 01/02/19 0933   Goal/Outcome Evaluation   Problems Assessed (Postpartum Vaginal Delivery) all   Problems Present (Postpartum Vag Deliv   01/02/19 0933   Goal/Outcome Evaluation   Problems Assessed (Postpartum Vaginal Delivery) all   Problems Present (Postpartum Vag Deliv) pain   ) pain

## 2019-01-02 NOTE — DISCHARGE SUMMARY
"Obstetrical Discharge Form    Primary OB Clinician: GRETA      Gestational Age: 39.1     Antepartum complications: Rh neg, increased BMI, anemia     Date of Delivery:  12/31/2018     Delivered By: Robby Ann     Delivery Type: spontaneous vaginal delivery    Tubal Ligation: n/a    Baby: Liveborn female, Apgars 8/9, weight 8 #, 5.8 oz,     Anesthesia: epidural    Intrapartum complications: None    Laceration: none    Feeding method: bottle     Discharge Date: 1/2/2019; Discharge Time: 9:16 AM    /74 (BP Location: Left arm, Patient Position: Sitting)   Pulse 54   Temp 97.7 °F (36.5 °C) (Oral)   Resp 18   Ht 170.2 cm (67\")   Wt 113 kg (249 lb)   SpO2 100%   Breastfeeding? Unknown   BMI 39.00 kg/m²      Abd: soft, fundus firm, non tender  Ext: trace edema, neg Cass's sign, no cords   Results from last 7 days   Lab Units  01/01/19   0527   HEMOGLOBIN g/dL  11.0*       Assessment:  1) S/P spontaneous vaginal delivery, postpartum day #2   2) Rh neg- infant Rh neg, rhogam not indicated     Plan:    Patient given written instruction sheet.  Follow-up appointment with [facility] in 6 weeks.    BLAINE Diane  9:16 AM  1/2/2019  "

## 2019-01-07 LAB
LAB AP CASE REPORT: NORMAL
PATH REPORT.FINAL DX SPEC: NORMAL

## 2019-01-11 NOTE — TELEPHONE ENCOUNTER
[employer] will not cover the Nexplanon until after 8/26/19 due to the patient having one within the past 3 years.  I called the patient and lmom for her to call if she would like another form of birth control until we can order the Nexplanon.

## 2019-10-03 ENCOUNTER — TELEPHONE (OUTPATIENT)
Dept: OBSTETRICS AND GYNECOLOGY | Facility: CLINIC | Age: 24
End: 2019-10-03

## 2019-10-03 NOTE — TELEPHONE ENCOUNTER
Nexplanon device available, I called the patient and lmom to call and schedule appointment for when she is on her cycle or if she is already on another form of birth control.

## 2020-09-30 ENCOUNTER — OFFICE VISIT (OUTPATIENT)
Dept: OBSTETRICS AND GYNECOLOGY | Facility: CLINIC | Age: 25
End: 2020-09-30

## 2020-09-30 VITALS
SYSTOLIC BLOOD PRESSURE: 110 MMHG | DIASTOLIC BLOOD PRESSURE: 72 MMHG | BODY MASS INDEX: 30.45 KG/M2 | WEIGHT: 194 LBS | HEIGHT: 67 IN

## 2020-09-30 DIAGNOSIS — Z30.09 ENCOUNTER FOR COUNSELING REGARDING CONTRACEPTION: ICD-10-CM

## 2020-09-30 DIAGNOSIS — N30.01 ACUTE CYSTITIS WITH HEMATURIA: Primary | ICD-10-CM

## 2020-09-30 DIAGNOSIS — Z13.9 SCREENING FOR CONDITION: ICD-10-CM

## 2020-09-30 LAB
B-HCG UR QL: NEGATIVE
BILIRUB BLD-MCNC: NEGATIVE MG/DL
CLARITY, POC: ABNORMAL
COLOR UR: ABNORMAL
GLUCOSE UR STRIP-MCNC: NEGATIVE MG/DL
INTERNAL NEGATIVE CONTROL: NEGATIVE
INTERNAL POSITIVE CONTROL: POSITIVE
KETONES UR QL: NEGATIVE
LEUKOCYTE EST, POC: NEGATIVE
Lab: NORMAL
NITRITE UR-MCNC: POSITIVE MG/ML
PH UR: 7 [PH] (ref 5–8)
PROT UR STRIP-MCNC: NEGATIVE MG/DL
RBC # UR STRIP: ABNORMAL /UL
SP GR UR: 1.03 (ref 1–1.03)
UROBILINOGEN UR QL: NORMAL

## 2020-09-30 PROCEDURE — 81025 URINE PREGNANCY TEST: CPT | Performed by: NURSE PRACTITIONER

## 2020-09-30 PROCEDURE — 99213 OFFICE O/P EST LOW 20 MIN: CPT | Performed by: NURSE PRACTITIONER

## 2020-09-30 RX ORDER — SULFAMETHOXAZOLE AND TRIMETHOPRIM 800; 160 MG/1; MG/1
1 TABLET ORAL 2 TIMES DAILY
Qty: 6 TABLET | Refills: 0 | Status: SHIPPED | OUTPATIENT
Start: 2020-09-30 | End: 2020-11-10

## 2020-09-30 NOTE — PROGRESS NOTES
"Subjective     Chief Complaint   Patient presents with   • Contraception       Adamaris Whitehead is a 25 y.o.  whose LMP is Patient's last menstrual period was 09/15/2020.. She presents for contraception today. She desires the Nexplanon. She is having UPIC. Reports she has had UPIC since her last menses.  She states, she does not want to be pregnant again.She was planning on getting the Nexplanon after her delivery last year, but she never returned to the office for the device to be placed. This problem is new to me, the examiner.       HPI    HPI    The following portions of the patient's history were reviewed and updated as appropriate:vital signs, allergies, current medications, past medical history, past social history, past surgical history and problem list      Review of Systems     Review of Systems   Constitutional: Negative.    Respiratory: Negative.    Cardiovascular: Negative.    Gastrointestinal: Negative.    Genitourinary: Negative.  Positive for pelvic pressure. Negative for difficulty urinating, dysuria and frequency.   Musculoskeletal: Negative.    Skin: Negative.    Neurological: Negative.    Psychiatric/Behavioral: Negative.        Objective      /72   Ht 170.2 cm (67\")   Wt 88 kg (194 lb)   LMP 09/15/2020   BMI 30.38 kg/m²     Physical Exam    Physical Exam  Vitals signs and nursing note reviewed.   Constitutional:       Appearance: Normal appearance.   Pulmonary:      Effort: Pulmonary effort is normal.   Musculoskeletal: Normal range of motion.         General: No swelling.   Skin:     General: Skin is dry.   Neurological:      General: No focal deficit present.      Mental Status: She is alert and oriented to person, place, and time.         Lab Review   Labs: Urine pregnancy test, Urinalysis - with micro     Imaging   No data reviewed    Assessment  Adamaris was seen today for contraception.    Diagnoses and all orders for this visit:    Screening for condition  -     POC " Urinalysis Dipstick  -     POC Pregnancy, Urine    Other orders  -     sulfamethoxazole-trimethoprim (Bactrim DS) 800-160 MG per tablet; Take 1 tablet by mouth 2 (Two) Times a Day.        Additional Assessment:   1. Contraception counseling  2. UTI  3. UPIC    Plan     1. Contraception counseling- Disc at length with patient that she either needs to be on her cycle or a regular form of contraception prior to her bar placement. Her LNMP was 9/15/20 and she is having unprotected SIC. Urine HCG is neg today but disc with patient that does not show anything from the last week or so. Offered to check quant today, abstain from SIC and have placement tomorrow, pt states she will not have a ride. . Pt very upset. Again disc importance of proving neg pregnancy prior to contraception placement.   2. UTI- Check urine cx. ERX Bactrim.   3. Scheduled for: STD Labs - N/A , Ultrasound of the -  N/A , Mammography - N/A , Bone Density Test - N/A , Additional Labs - N/A  4. Pap:  Needs   5. Contraception: Desires Nexplanon. Benefits checked per CONCHIS Shahid  6. STD:  Enc condom use.   7. Smoking status: non somker  8.   BMI: Patient's Body mass index is 30.38 kg/m². BMI is above normal parameters. Recommendations include: none today.   9.   Annual exam: Needs to scheduled       No follow-ups on file.      Beena Haddad, APRN  9/30/2020

## 2020-10-03 LAB
BACTERIA UR CULT: ABNORMAL
BACTERIA UR CULT: ABNORMAL
OTHER ANTIBIOTIC SUSC ISLT: ABNORMAL

## 2020-11-10 ENCOUNTER — OFFICE VISIT (OUTPATIENT)
Dept: OBSTETRICS AND GYNECOLOGY | Facility: CLINIC | Age: 25
End: 2020-11-10

## 2020-11-10 VITALS
SYSTOLIC BLOOD PRESSURE: 130 MMHG | BODY MASS INDEX: 32.18 KG/M2 | HEIGHT: 67 IN | DIASTOLIC BLOOD PRESSURE: 82 MMHG | WEIGHT: 205 LBS

## 2020-11-10 DIAGNOSIS — Z30.46 ENCOUNTER FOR SURVEILLANCE OF IMPLANTABLE SUBDERMAL CONTRACEPTIVE: Primary | ICD-10-CM

## 2020-11-10 DIAGNOSIS — Z30.017 NEXPLANON INSERTION: ICD-10-CM

## 2020-11-10 PROBLEM — O36.8190 DECREASED FETAL MOVEMENTS AFFECTING MANAGEMENT OF MOTHER, ANTEPARTUM: Status: RESOLVED | Noted: 2018-12-20 | Resolved: 2020-11-10

## 2020-11-10 PROBLEM — IMO0002 INFANT, LARGE: Status: RESOLVED | Noted: 2018-10-15 | Resolved: 2020-11-10

## 2020-11-10 PROBLEM — O99.019 ANEMIA AFFECTING PREGNANCY: Status: RESOLVED | Noted: 2018-10-16 | Resolved: 2020-11-10

## 2020-11-10 PROBLEM — Z22.330 GBS CARRIER: Status: RESOLVED | Noted: 2018-12-20 | Resolved: 2020-11-10

## 2020-11-10 PROBLEM — Z34.93 PRENATAL CARE IN THIRD TRIMESTER: Status: RESOLVED | Noted: 2018-11-07 | Resolved: 2020-11-10

## 2020-11-10 PROBLEM — O21.9 NAUSEA AND VOMITING DURING PREGNANCY PRIOR TO 22 WEEKS GESTATION: Status: RESOLVED | Noted: 2018-06-22 | Resolved: 2020-11-10

## 2020-11-10 PROBLEM — Z37.9 NORMAL LABOR: Status: RESOLVED | Noted: 2018-12-31 | Resolved: 2020-11-10

## 2020-11-10 LAB
B-HCG UR QL: NEGATIVE
INTERNAL NEGATIVE CONTROL: NEGATIVE
INTERNAL POSITIVE CONTROL: POSITIVE
Lab: NORMAL

## 2020-11-10 PROCEDURE — 81025 URINE PREGNANCY TEST: CPT | Performed by: OBSTETRICS & GYNECOLOGY

## 2020-11-10 PROCEDURE — 11981 INSERTION DRUG DLVR IMPLANT: CPT | Performed by: OBSTETRICS & GYNECOLOGY

## 2020-11-10 NOTE — PROGRESS NOTES
Nexplanon Insertion:  Chief Complaint   Patient presents with   • Contraception     nexplanon       Pre Dx: 1) Nexplanon Insertion   Post Dx: 1) Nexplanon Insertion     Procedures    Risks, benefits, alternatives explained. All questions answered. Consents signed.  Patient placed on examined table. Nexplanon to be inserted into nondominant arm. The area for insertion prepped with betadine in a sterile fashion. About 1 mL of 1% lidocaine was infiltrated without incident.    The insertion site was identified approximately 6-8 cm proximal to the elbow crease in the underside of the upper left arm overlying the groove between the biceps and triceps muscles. The skin at the insertion site was stretched by my thumb and index finger. Then inserted the needle tip, bevel side up, at an angle not greater than 20° to the skin surface, just until the skin was penetrated. The applicator was then lowered so that it was parallel to the arm. To minimize the chance of deep incision, the skin was tented upwards with the tip of the needle. The needle was then gently inserted to the full length. Then fixed the device in place on the arm with one hand. I then slowly and carefully retracted the needle cannula back along the length of the device after pushing the release button. The obturator was seen in the device to determine that the nexplanon had been released.     Both the patient and I were easily able to feel the device under the skin. Steri-Strips were applied at the site, and the arm was gently wrapped with gauze.    There were no complications.   The patient tolerated the procedure well.     She was given a reminder card. She was advised to use condoms as a backup method for at least a week after insertion.    Expectations, warning signs and limitations reviewed.     Diagnoses and all orders for this visit:    1. Encounter for surveillance of implantable subdermal contraceptive (Primary)  -     POC Pregnancy, Urine  -      Etonogestrel (NEXPLANON) 68 MG subdermal implant    2. Nexplanon insertion: 11/10/20        RTO Return if symptoms worsen or fail to improve.      Logan Denton MD    11/10/2020  15:47 EST

## 2023-08-21 ENCOUNTER — HOSPITAL ENCOUNTER (EMERGENCY)
Facility: HOSPITAL | Age: 28
Discharge: HOME | End: 2023-08-21
Attending: EMERGENCY MEDICINE | Admitting: EMERGENCY MEDICINE
Payer: COMMERCIAL

## 2023-08-21 VITALS
SYSTOLIC BLOOD PRESSURE: 122 MMHG | DIASTOLIC BLOOD PRESSURE: 82 MMHG | HEART RATE: 97 BPM | RESPIRATION RATE: 18 BRPM | BODY MASS INDEX: 27.94 KG/M2 | WEIGHT: 178 LBS | HEIGHT: 67 IN | OXYGEN SATURATION: 99 % | TEMPERATURE: 98 F

## 2023-08-21 DIAGNOSIS — N12 PYELONEPHRITIS: Primary | ICD-10-CM

## 2023-08-21 LAB
ANION GAP SERPL CALCULATED.3IONS-SCNC: 9.9 MMOL/L (ref 5–15)
B-HCG UR QL: NEGATIVE
BACTERIA UR QL AUTO: ABNORMAL /HPF
BASOPHILS # BLD AUTO: 0.04 10*3/MM3 (ref 0–0.2)
BASOPHILS NFR BLD AUTO: 0.4 % (ref 0–1.5)
BILIRUB UR QL STRIP: NEGATIVE
BUN SERPL-MCNC: 14 MG/DL (ref 6–20)
BUN/CREAT SERPL: 17.9 (ref 7–25)
CALCIUM SPEC-SCNC: 9 MG/DL (ref 8.6–10.5)
CHLORIDE SERPL-SCNC: 101 MMOL/L (ref 98–107)
CLARITY UR: ABNORMAL
CO2 SERPL-SCNC: 24.1 MMOL/L (ref 22–29)
COLOR UR: YELLOW
CREAT SERPL-MCNC: 0.78 MG/DL (ref 0.57–1)
DEPRECATED RDW RBC AUTO: 45.2 FL (ref 37–54)
EGFRCR SERPLBLD CKD-EPI 2021: 106.9 ML/MIN/1.73
EOSINOPHIL # BLD AUTO: 0.13 10*3/MM3 (ref 0–0.4)
EOSINOPHIL NFR BLD AUTO: 1.4 % (ref 0.3–6.2)
ERYTHROCYTE [DISTWIDTH] IN BLOOD BY AUTOMATED COUNT: 12.7 % (ref 12.3–15.4)
GLUCOSE SERPL-MCNC: 95 MG/DL (ref 65–99)
GLUCOSE UR STRIP-MCNC: NEGATIVE MG/DL
HCT VFR BLD AUTO: 42.9 % (ref 34–46.6)
HGB BLD-MCNC: 13.6 G/DL (ref 12–15.9)
HGB UR QL STRIP.AUTO: ABNORMAL
HYALINE CASTS UR QL AUTO: ABNORMAL /LPF
IMM GRANULOCYTES # BLD AUTO: 0.01 10*3/MM3 (ref 0–0.05)
IMM GRANULOCYTES NFR BLD AUTO: 0.1 % (ref 0–0.5)
KETONES UR QL STRIP: NEGATIVE
LEUKOCYTE ESTERASE UR QL STRIP.AUTO: ABNORMAL
LYMPHOCYTES # BLD AUTO: 2.76 10*3/MM3 (ref 0.7–3.1)
LYMPHOCYTES NFR BLD AUTO: 30.5 % (ref 19.6–45.3)
MCH RBC QN AUTO: 30 PG (ref 26.6–33)
MCHC RBC AUTO-ENTMCNC: 31.7 G/DL (ref 31.5–35.7)
MCV RBC AUTO: 94.7 FL (ref 79–97)
MONOCYTES # BLD AUTO: 0.42 10*3/MM3 (ref 0.1–0.9)
MONOCYTES NFR BLD AUTO: 4.6 % (ref 5–12)
NEUTROPHILS NFR BLD AUTO: 5.7 10*3/MM3 (ref 1.7–7)
NEUTROPHILS NFR BLD AUTO: 63 % (ref 42.7–76)
NITRITE UR QL STRIP: POSITIVE
PH UR STRIP.AUTO: 7.5 [PH] (ref 4.5–8)
PLATELET # BLD AUTO: 213 10*3/MM3 (ref 140–450)
PMV BLD AUTO: 11.6 FL (ref 6–12)
POTASSIUM SERPL-SCNC: 4.1 MMOL/L (ref 3.5–5.2)
PROT UR QL STRIP: ABNORMAL
RBC # BLD AUTO: 4.53 10*6/MM3 (ref 3.77–5.28)
RBC # UR STRIP: ABNORMAL /HPF
REF LAB TEST METHOD: ABNORMAL
SODIUM SERPL-SCNC: 135 MMOL/L (ref 136–145)
SP GR UR STRIP: 1.02 (ref 1–1.03)
SQUAMOUS #/AREA URNS HPF: ABNORMAL /HPF
UROBILINOGEN UR QL STRIP: ABNORMAL
WBC # UR STRIP: ABNORMAL /HPF
WBC NRBC COR # BLD: 9.06 10*3/MM3 (ref 3.4–10.8)

## 2023-08-21 PROCEDURE — 36415 COLL VENOUS BLD VENIPUNCTURE: CPT

## 2023-08-21 PROCEDURE — 85025 COMPLETE CBC W/AUTO DIFF WBC: CPT | Performed by: PHYSICIAN ASSISTANT

## 2023-08-21 PROCEDURE — 81001 URINALYSIS AUTO W/SCOPE: CPT | Performed by: PHYSICIAN ASSISTANT

## 2023-08-21 PROCEDURE — 87186 SC STD MICRODIL/AGAR DIL: CPT | Performed by: PHYSICIAN ASSISTANT

## 2023-08-21 PROCEDURE — 80048 BASIC METABOLIC PNL TOTAL CA: CPT | Performed by: PHYSICIAN ASSISTANT

## 2023-08-21 PROCEDURE — 81025 URINE PREGNANCY TEST: CPT | Performed by: PHYSICIAN ASSISTANT

## 2023-08-21 PROCEDURE — 99282 EMERGENCY DEPT VISIT SF MDM: CPT

## 2023-08-21 PROCEDURE — 87077 CULTURE AEROBIC IDENTIFY: CPT | Performed by: PHYSICIAN ASSISTANT

## 2023-08-21 PROCEDURE — 87086 URINE CULTURE/COLONY COUNT: CPT | Performed by: PHYSICIAN ASSISTANT

## 2023-08-21 RX ORDER — CIPROFLOXACIN 500 MG/1
500 TABLET, FILM COATED ORAL 2 TIMES DAILY
Qty: 20 TABLET | Refills: 0 | Status: SHIPPED | OUTPATIENT
Start: 2023-08-21 | End: 2023-08-31

## 2023-08-21 NOTE — ED PROVIDER NOTES
EMERGENCY DEPARTMENT ENCOUNTER      Room Number: TOR/TOR    History is provided by the patient, no translation services needed    HPI:    Chief complaint: Urinary complaints    Narrative: Pt is a 28 y.o. female who presents complaining of urinary complaint.  Patient reports over the last 2 to 3 days she is been having some dysuria and difficulties urinating.  Reports she has also had some blood in the urine.  Patient reports she recently had a hysterectomy and has been having issues with urination since then.  She reports no history of kidney stones.  She denies the pain as unilateral states it is more bilateral across her back.  Reports no fevers, vomiting, diarrhea.  No other complaints at this time.      PMD: Kristen Hamilton MD    REVIEW OF SYSTEMS  Review of Systems  Complete review of systems performed otherwise negative except pertinent positives per HPI.    PAST MEDICAL HISTORY  Active Ambulatory Problems     Diagnosis Date Noted    Rosacea 07/12/2018    Pyelonephritis affecting pregnancy- on nightly Macrobid suppression 07/12/2018    Positive urine drug screen= + THC, counseled= 10/15/2018    Nexplanon insertion: 11/10/20 11/10/2020     Resolved Ambulatory Problems     Diagnosis Date Noted    Pregnant 11/16/2016    Anemia, postpartum 11/17/2016    Normal labor 11/18/2016    Rh negative status during pregnancy- Rhogam 10/15/18 11/18/2016    Vaginal delivery 11/18/2016    Initial obstetric visit in first trimester 06/22/2018    BMI 40.0-44.9, adult 06/22/2018    Nausea and vomiting during pregnancy prior to 22 weeks gestation 06/22/2018    Encounter for supervision of normal pregnancy in multigravida, antepartum 06/22/2018    Acute pyelonephritis 07/12/2018    Infant, large- h/o 8.12 # infant, watch growth 36-37 weeks 10/15/2018    Anemia affecting pregnancy 10/16/2018    Prenatal care in third trimester 11/07/2018    GBS carrier 12/20/2018    Decreased fetal movements affecting management of mother,  antepartum 2018    Normal labor 2018     Past Medical History:   Diagnosis Date    Asthma     UTI (urinary tract infection)        PAST SURGICAL HISTORY  Past Surgical History:   Procedure Laterality Date    HYSTERECTOMY      TONSILLECTOMY AND ADENOIDECTOMY      WISDOM TOOTH EXTRACTION Bilateral        FAMILY HISTORY  Family History   Problem Relation Age of Onset    Diabetes Mother     Heart attack Father     Cancer Sister     Diabetes Maternal Grandmother     Cancer Maternal Grandmother     Diabetes Maternal Grandfather     Stroke Maternal Grandfather     Heart attack Maternal Grandfather        SOCIAL HISTORY  Social History     Socioeconomic History    Marital status: Single   Tobacco Use    Smoking status: Former     Packs/day: 1.00     Years: 6.00     Pack years: 6.00     Types: Cigarettes     Quit date: 2016     Years since quittin.3    Smokeless tobacco: Never   Substance and Sexual Activity    Alcohol use: No    Drug use: No    Sexual activity: Yes     Partners: Male       ALLERGIES  Patient has no known allergies.    No current facility-administered medications for this encounter.    Current Outpatient Medications:     ciprofloxacin (CIPRO) 500 MG tablet, Take 1 tablet by mouth 2 (Two) Times a Day for 10 days., Disp: 20 tablet, Rfl: 0    PHYSICAL EXAM  ED Triage Vitals [23 1322]   Temp Heart Rate Resp BP SpO2   98 øF (36.7 øC) 97 18 122/82 99 %      Temp src Heart Rate Source Patient Position BP Location FiO2 (%)   Oral Monitor Sitting Left arm --       Physical Exam  Vitals and nursing note reviewed.   Constitutional:       Appearance: Normal appearance. She is normal weight. She is not ill-appearing or toxic-appearing.   HENT:      Head: Normocephalic and atraumatic.      Nose: Nose normal.      Mouth/Throat:      Mouth: Mucous membranes are moist.   Eyes:      Extraocular Movements: Extraocular movements intact.      Conjunctiva/sclera: Conjunctivae normal.      Pupils: Pupils  are equal, round, and reactive to light.   Cardiovascular:      Rate and Rhythm: Normal rate and regular rhythm.   Pulmonary:      Effort: Pulmonary effort is normal.   Abdominal:      General: Abdomen is flat.      Palpations: Abdomen is soft.      Tenderness: There is right CVA tenderness and left CVA tenderness.   Musculoskeletal:         General: Normal range of motion.      Cervical back: Normal range of motion. No tenderness.   Skin:     General: Skin is warm.      Capillary Refill: Capillary refill takes less than 2 seconds.      Coloration: Skin is not pale.   Neurological:      General: No focal deficit present.      Mental Status: She is alert and oriented to person, place, and time.   Psychiatric:         Mood and Affect: Mood normal.         LAB RESULTS  Lab Results (last 24 hours)       Procedure Component Value Units Date/Time    CBC & Differential [[id number]]  (Abnormal) Collected: 08/21/23 1343    Specimen: Blood from Arm, Right Updated: 08/21/23 1352    Narrative:      The following orders were created for panel order CBC & Differential.  Procedure                               Abnormality         Status                     ---------                               -----------         ------                     CBC Auto Differential[[id number]]        Abnormal            Final result                 Please view results for these tests on the individual orders.    Basic Metabolic Panel [[id number]]  (Abnormal) Collected: 08/21/23 1343    Specimen: Blood from Arm, Right Updated: 08/21/23 1407     Glucose 95 mg/dL      BUN 14 mg/dL      Creatinine 0.78 mg/dL      Sodium 135 mmol/L      Potassium 4.1 mmol/L      Chloride 101 mmol/L      CO2 24.1 mmol/L      Calcium 9.0 mg/dL      BUN/Creatinine Ratio 17.9     Anion Gap 9.9 mmol/L      eGFR 106.9 mL/min/1.73     Narrative:      GFR Normal >60  Chronic Kidney Disease <60  Kidney Failure <15      CBC Auto Differential [[id number]]  (Abnormal) Collected:  08/21/23 1343    Specimen: Blood from Arm, Right Updated: 08/21/23 1352     WBC 9.06 10*3/mm3      RBC 4.53 10*6/mm3      Hemoglobin 13.6 g/dL      Hematocrit 42.9 %      MCV 94.7 fL      MCH 30.0 pg      MCHC 31.7 g/dL      RDW 12.7 %      RDW-SD 45.2 fl      MPV 11.6 fL      Platelets 213 10*3/mm3      Neutrophil % 63.0 %      Lymphocyte % 30.5 %      Monocyte % 4.6 %      Eosinophil % 1.4 %      Basophil % 0.4 %      Immature Grans % 0.1 %      Neutrophils, Absolute 5.70 10*3/mm3      Lymphocytes, Absolute 2.76 10*3/mm3      Monocytes, Absolute 0.42 10*3/mm3      Eosinophils, Absolute 0.13 10*3/mm3      Basophils, Absolute 0.04 10*3/mm3      Immature Grans, Absolute 0.01 10*3/mm3     Pregnancy, Urine - Urine, Clean Catch [[id number]]  (Normal) Collected: 08/21/23 1345    Specimen: Urine, Clean Catch Updated: 08/21/23 1355     HCG, Urine QL Negative    Urinalysis With Microscopic If Indicated (No Culture) - Urine, Clean Catch [[id number]]  (Abnormal) Collected: 08/21/23 1345    Specimen: Urine, Clean Catch Updated: 08/21/23 1355     Color, UA Yellow     Appearance, UA Cloudy     pH, UA 7.5     Specific Gravity, UA 1.020     Glucose, UA Negative     Ketones, UA Negative     Bilirubin, UA Negative     Blood, UA Trace     Protein, UA 30 mg/dL (1+)     Leuk Esterase, UA Moderate (2+)     Nitrite, UA Positive     Urobilinogen, UA 1.0 E.U./dL    Urinalysis, Microscopic Only - Urine, Clean Catch [[id number]]  (Abnormal) Collected: 08/21/23 1345    Specimen: Urine, Clean Catch Updated: 08/21/23 1402     RBC, UA 3-5 /HPF      WBC, UA 13-20 /HPF      Bacteria, UA 2+ /HPF      Squamous Epithelial Cells, UA 3-6 /HPF      Hyaline Casts, UA None Seen /LPF      Methodology Manual Light Microscopy    Urine Culture - Urine, Urine, Clean Catch [[id number]] Collected: 08/21/23 1345    Specimen: Urine, Clean Catch Updated: 08/21/23 1451              I ordered the above labs and reviewed the results    RADIOLOGY  No Radiology Exams  Resulted Within Past 24 Hours    I ordered the above radiologic testing and reviewed the results    PROCEDURES  Procedures      PROGRESS AND CONSULTS           MEDICAL DECISION MAKING    MDM     Amount and/or Complexity of Data Reviewed  Clinical lab tests: reviewed    28-year-old female seen and evaluated for dysuria and back pain.  Patient reports her symptoms have been ongoing for the past 3 days.  Denies any systemic symptoms such as fever, nausea or vomiting.  On arrival she is alert, oriented her vitals are stable and she is afebrile.  On physical exam she does have CVA tenderness bilaterally and some suprapubic discomfort with palpation.  No other abnormalities present on my exam.  Urine pregnancy performed and is negative  Urinalysis performed as both leukocytes and nitrite positive concerning for urinary tract infection and with her back pain I am more suspicious for pyelonephritis.  CBC and BMP are unremarkable.  I think it is appropriate that patient be treated with antibiotics.  I will start her on ciprofloxacin for treatment of suspected pyelonephritis and I also sent the urine for culture.  I think the likelihood of kidney stone is less likely due to the fact that patient's pain is bilateral and she reports it is a constant pain as opposed to an intermittent pain.  Therefore CT imaging was not performed for a renal stone.  Patient was encouraged to return to the ER if her symptoms do not improve or if she develops a fever.  She voiced understanding and was agreeable with this plan.       DIAGNOSIS  Final diagnoses:   Pyelonephritis       Latest Documented Vital Signs:  As of 16:42 EDT  BP- 122/82 HR- 97 Temp- 98 øF (36.7 øC) (Oral) O2 sat- 99%    DISPOSITION    Discussed pertinent findings with the patient/family.  Patient/Family voiced understanding of need to follow-up for recheck and further testing as needed.  Return to the Emergency Department warnings were given.         Medication List         New Prescriptions      ciprofloxacin 500 MG tablet  Commonly known as: CIPRO  Take 1 tablet by mouth 2 (Two) Times a Day for 10 days.               Where to Get Your Medications        These medications were sent to [id number] - [place], [place] - [address] - [phone]  - [phone]   [address] [place] [address]      Phone: [phone]   ciprofloxacin 500 MG tablet              Follow-up Information       Call  Kristen Hamilton MD.    Specialties: Family Medicine, Emergency Medicine  Why: To schedule follow up appointment  Contact information:  [address] [place] [address] [phone]                               Dictated utilizing Dragon dictation     Sara Plascencia PAAdrianaC  08/21/23 3043

## 2023-08-21 NOTE — Clinical Note
[place]  [facility] EMERGENCY DEPARTMENT  [address] [place] [address]  Phone: [phone]    Adamaris Whitehead was seen and treated in our emergency department on 8/21/2023.  She may return to work on 08/23/2023.         Thank you for choosing [facility].    Jose Paulson MD

## 2023-08-21 NOTE — DISCHARGE INSTRUCTIONS
Please follow-up with your primary care doctor.  Take antibiotics as directed.  If after taking antibiotics for 48 hours and your symptoms are getting worse please return emergently to the emergency department.

## 2023-08-23 LAB — BACTERIA SPEC AEROBE CULT: ABNORMAL

## 2023-08-24 NOTE — ED PROVIDER NOTES
Subjective   History of Present Illness    Review of Systems    Past Medical History:   Diagnosis Date    Asthma     Rosacea     UTI (urinary tract infection)        No Known Allergies    Past Surgical History:   Procedure Laterality Date    HYSTERECTOMY      TONSILLECTOMY AND ADENOIDECTOMY      WISDOM TOOTH EXTRACTION Bilateral        Family History   Problem Relation Age of Onset    Diabetes Mother     Heart attack Father     Cancer Sister     Diabetes Maternal Grandmother     Cancer Maternal Grandmother     Diabetes Maternal Grandfather     Stroke Maternal Grandfather     Heart attack Maternal Grandfather        Social History     Socioeconomic History    Marital status: Single   Tobacco Use    Smoking status: Former     Packs/day: 1.00     Years: 6.00     Pack years: 6.00     Types: Cigarettes     Quit date: 2016     Years since quittin.3    Smokeless tobacco: Never   Substance and Sexual Activity    Alcohol use: No    Drug use: No    Sexual activity: Yes     Partners: Male           Objective   Physical Exam    Procedures           ED Course  ED Course as of 23 1420   Thu Aug 24, 2023   1419 The patient's urine culture was E. coli indeterminate for quinolones.  I called the patient and she stated she was slightly better but still having symptoms of dysuria.  The urine culture showed good sensitivity to cephalosporins so I called in Ceftin 500 mg #10 to the [facility] pharmacy in [place] per the patient's request. [TP]      ED Course User Index  [TP] Jose Paulson MD                                           Medical Decision Making  Problems Addressed:  Pyelonephritis: complicated acute illness or injury    Amount and/or Complexity of Data Reviewed  Labs: ordered.    Risk  Prescription drug management.        Final diagnoses:   Pyelonephritis       ED Disposition  ED Disposition       ED Disposition   Discharge    Condition   Stable    Comment   --               Kristen Hamilton MD  [address] [place] [address]  [address]  [facility] [address] [phone]    Call   To schedule follow up appointment         Medication List        New Prescriptions      ciprofloxacin 500 MG tablet  Commonly known as: CIPRO  Take 1 tablet by mouth 2 (Two) Times a Day for 10 days.               Where to Get Your Medications        These medications were sent to [facility] - [place] - [address] - [phone]  - [phone]   [address], [place] [address]      Phone: [phone]   ciprofloxacin 500 MG tablet            Jose Paulson MD  08/24/23 9081

## 2023-11-06 ENCOUNTER — HOSPITAL ENCOUNTER (EMERGENCY)
Facility: HOSPITAL | Age: 28
Discharge: HOME | End: 2023-11-06
Attending: STUDENT IN AN ORGANIZED HEALTH CARE EDUCATION/TRAINING PROGRAM | Admitting: STUDENT IN AN ORGANIZED HEALTH CARE EDUCATION/TRAINING PROGRAM
Payer: COMMERCIAL

## 2023-11-06 VITALS
HEART RATE: 71 BPM | DIASTOLIC BLOOD PRESSURE: 99 MMHG | OXYGEN SATURATION: 100 % | WEIGHT: 218.7 LBS | TEMPERATURE: 98.2 F | HEIGHT: 67 IN | SYSTOLIC BLOOD PRESSURE: 148 MMHG | RESPIRATION RATE: 16 BRPM | BODY MASS INDEX: 34.33 KG/M2

## 2023-11-06 DIAGNOSIS — R11.2 NAUSEA AND VOMITING, UNSPECIFIED VOMITING TYPE: ICD-10-CM

## 2023-11-06 DIAGNOSIS — Z78.9 ALCOHOL USE: Primary | ICD-10-CM

## 2023-11-06 LAB — GLUCOSE BLDC GLUCOMTR-MCNC: 113 MG/DL (ref 70–130)

## 2023-11-06 PROCEDURE — 25010000002 DROPERIDOL PER 5 MG: Performed by: STUDENT IN AN ORGANIZED HEALTH CARE EDUCATION/TRAINING PROGRAM

## 2023-11-06 PROCEDURE — 82948 REAGENT STRIP/BLOOD GLUCOSE: CPT

## 2023-11-06 PROCEDURE — 99283 EMERGENCY DEPT VISIT LOW MDM: CPT

## 2023-11-06 PROCEDURE — 96374 THER/PROPH/DIAG INJ IV PUSH: CPT

## 2023-11-06 RX ORDER — DROPERIDOL 2.5 MG/ML
2.5 INJECTION, SOLUTION INTRAMUSCULAR; INTRAVENOUS ONCE
Status: COMPLETED | OUTPATIENT
Start: 2023-11-06 | End: 2023-11-06

## 2023-11-06 RX ADMIN — DROPERIDOL 2.5 MG: 2.5 INJECTION, SOLUTION INTRAMUSCULAR; INTRAVENOUS at 10:04

## 2023-11-06 NOTE — ED PROVIDER NOTES
Subjective   History of Present Illness  Pt is a 28 y.o. female with PMH as listed who presents for   Chief Complaint   Patient presents with    Nausea    Vomiting     Patient brought in by EMS patient states she has been partying x 2 days, last night she took a pill that she believed to be cocaine, patient is nauseated, vomiting difficulty breathing.       Patient is a 28-year-old female who presents for alcohol drug use over the past 2 days and now complaining of headache, nausea, vomiting, light sensitivity.  She has no other new pains and no other new symptoms.  She has a history of a prior hysterectomy.  Denies any fever, vitals within normal limits.  No other new complaints at this time.    Review of Systems    Past Medical History:   Diagnosis Date    Asthma     Rosacea     UTI (urinary tract infection)        No Known Allergies    Past Surgical History:   Procedure Laterality Date    HYSTERECTOMY      TONSILLECTOMY AND ADENOIDECTOMY      WISDOM TOOTH EXTRACTION Bilateral        Family History   Problem Relation Age of Onset    Diabetes Mother     Heart attack Father     Cancer Sister     Diabetes Maternal Grandmother     Cancer Maternal Grandmother     Diabetes Maternal Grandfather     Stroke Maternal Grandfather     Heart attack Maternal Grandfather        Social History     Socioeconomic History    Marital status: Single   Tobacco Use    Smoking status: Former     Packs/day: 1.00     Years: 6.00     Additional pack years: 0.00     Total pack years: 6.00     Types: Cigarettes     Quit date: 2016     Years since quittin.6    Smokeless tobacco: Never   Substance and Sexual Activity    Alcohol use: No    Drug use: No    Sexual activity: Yes     Partners: Male           Objective   Physical Exam  Constitutional:       Appearance: Normal appearance.   HENT:      Head: Normocephalic and atraumatic.      Mouth/Throat:      Mouth: Mucous membranes are moist.      Pharynx: Oropharynx is clear.   Eyes:       Conjunctiva/sclera: Conjunctivae normal.   Cardiovascular:      Rate and Rhythm: Normal rate and regular rhythm.   Pulmonary:      Effort: Pulmonary effort is normal.      Breath sounds: Normal breath sounds.   Abdominal:      General: Abdomen is flat.      Palpations: Abdomen is soft.   Musculoskeletal:      Cervical back: Neck supple.   Skin:     General: Skin is warm and dry.   Neurological:      Mental Status: She is alert.   Psychiatric:         Mood and Affect: Mood normal.         Procedures           ED Course  ED Course as of 11/06/23 1127   Mon Nov 06, 2023   1001 Patient presents for nausea vomiting after significant alcohol use past 2 days as well as taking unknown pill from friend at a party last night.  Exam is unremarkable, has history of hysterectomy.  Will check POC glucose and treat nausea and vomiting with droperidol. [JF]   1126 Patient feeling well currently, tolerating p.o.  Given normal glucose and resolution of symptoms with droperidol will discharge patient with PCP follow-up and return if she develops any new or worsening complaints.  Patient stands and agrees plan of care.  All questions answered. [JF]      ED Course User Index  [JF] Vj Brower MD                                           Medical Decision Making  My differential diagnosis for vomiting includes but is not limited to viral illness including COVID-19, gastroenteritis, esophageal food impaction, pregnancy related vomiting, cyclic vomiting, food poisoning, alcohol poisoning, alcohol withdrawal, opioid withdrawal, benzo withdrawal, medication side effects, overdose, chemical ingestion, chemical exposures, gallbladder disease, appendicitis, bowel obstruction, DKA, AKA and panic attacks.      Problems Addressed:  Alcohol use: complicated acute illness or injury  Nausea and vomiting, unspecified vomiting type: complicated acute illness or injury    Amount and/or Complexity of Data Reviewed  Labs: ordered.     Details: POC  glucose 113    Risk  Prescription drug management.        Final diagnoses:   Alcohol use   Nausea and vomiting, unspecified vomiting type       ED Disposition  ED Disposition       ED Disposition   Discharge    Condition   Stable    Comment   --               Adelita Piper MD  [address] [address] [phone]    Schedule an appointment as soon as possible for a visit in 2 days  For re-evaluation         Medication List      No changes were made to your prescriptions during this visit.            Vj Brower MD  11/06/23 1126

## 2023-11-06 NOTE — ED NOTES
This RN called patients mother Carolyn @ [phone] per daughters request. Patients mother stated she is unable to  patient at discharge, and that the patient will need to Uber or walk home. I discussed this with patient and she is currently on her phone attempting [email or web address] find other transportation.